# Patient Record
Sex: FEMALE | Race: OTHER | Employment: FULL TIME | URBAN - METROPOLITAN AREA
[De-identification: names, ages, dates, MRNs, and addresses within clinical notes are randomized per-mention and may not be internally consistent; named-entity substitution may affect disease eponyms.]

---

## 2017-05-15 PROBLEM — R51.9 PERSISTENT HEADACHES: Status: ACTIVE | Noted: 2017-05-15

## 2017-05-18 ENCOUNTER — HOSPITAL ENCOUNTER (OUTPATIENT)
Dept: GENERAL RADIOLOGY | Facility: HOSPITAL | Age: 42
Discharge: HOME | End: 2017-05-18
Attending: FAMILY MEDICINE
Payer: COMMERCIAL

## 2017-05-18 DIAGNOSIS — M25.511 RIGHT SHOULDER PAIN: ICD-10-CM

## 2017-05-18 DIAGNOSIS — G89.29 CHRONIC HEADACHE: ICD-10-CM

## 2017-05-18 DIAGNOSIS — M53.3 COCCYDYNIA: ICD-10-CM

## 2017-05-18 DIAGNOSIS — R51.9 CHRONIC HEADACHE: ICD-10-CM

## 2017-05-18 PROCEDURE — 73030 X-RAY EXAM OF SHOULDER: CPT | Performed by: FAMILY MEDICINE

## 2017-05-18 PROCEDURE — 72050 X-RAY EXAM NECK SPINE 4/5VWS: CPT | Performed by: FAMILY MEDICINE

## 2017-05-18 PROCEDURE — 72220 X-RAY EXAM SACRUM TAILBONE: CPT | Performed by: FAMILY MEDICINE

## 2017-06-27 ENCOUNTER — OFFICE VISIT (OUTPATIENT)
Dept: OBGYN CLINIC | Facility: CLINIC | Age: 42
End: 2017-06-27

## 2017-06-27 ENCOUNTER — APPOINTMENT (OUTPATIENT)
Dept: LAB | Facility: REFERENCE LAB | Age: 42
End: 2017-06-27
Attending: OBSTETRICS & GYNECOLOGY
Payer: COMMERCIAL

## 2017-06-27 VITALS
BODY MASS INDEX: 25.21 KG/M2 | HEIGHT: 62 IN | WEIGHT: 137 LBS | SYSTOLIC BLOOD PRESSURE: 120 MMHG | DIASTOLIC BLOOD PRESSURE: 82 MMHG

## 2017-06-27 DIAGNOSIS — Z32.00 ENCOUNTER FOR CONFIRMATION OF PREGNANCY TEST RESULT WITH PHYSICAL EXAMINATION: ICD-10-CM

## 2017-06-27 DIAGNOSIS — Z32.01 PREGNANCY EXAMINATION OR TEST, POSITIVE RESULT: Primary | ICD-10-CM

## 2017-06-27 PROCEDURE — 99204 OFFICE O/P NEW MOD 45 MIN: CPT | Performed by: OBSTETRICS & GYNECOLOGY

## 2017-06-27 PROCEDURE — 84144 ASSAY OF PROGESTERONE: CPT | Performed by: OBSTETRICS & GYNECOLOGY

## 2017-06-27 PROCEDURE — 81025 URINE PREGNANCY TEST: CPT | Performed by: OBSTETRICS & GYNECOLOGY

## 2017-06-27 PROCEDURE — 84702 CHORIONIC GONADOTROPIN TEST: CPT | Performed by: OBSTETRICS & GYNECOLOGY

## 2017-06-27 NOTE — PROGRESS NOTES
GYN H&P     2017  4:00 PM    CC:Patient presents for confirmation of pregnancy    HPI: Patient is a 42year old  Patient's last menstrual period was 2017. who presents with + UCG EGA 7 W edc 2/10/2017.  Attempting pregnancy for the past 3 Systems:    Pertinent positive and negatives reviewed in HPI      /82 (BP Location: Left arm, Patient Position: Sitting)   Ht 62\"   Wt 137 lb   LMP 05/05/2017   Breastfeeding?  No   BMI 25.06 kg/m²     Exam:   GENERAL: well developed, well nourished,

## 2017-06-28 ENCOUNTER — ULTRASOUND ENCOUNTER (OUTPATIENT)
Dept: OBGYN CLINIC | Facility: CLINIC | Age: 42
End: 2017-06-28

## 2017-06-28 ENCOUNTER — TELEPHONE (OUTPATIENT)
Dept: OBGYN CLINIC | Facility: CLINIC | Age: 42
End: 2017-06-28

## 2017-06-28 ENCOUNTER — APPOINTMENT (OUTPATIENT)
Dept: LAB | Facility: REFERENCE LAB | Age: 42
End: 2017-06-28
Attending: OBSTETRICS & GYNECOLOGY
Payer: COMMERCIAL

## 2017-06-28 DIAGNOSIS — Z32.01 PREGNANCY EXAMINATION OR TEST, POSITIVE RESULT: Primary | ICD-10-CM

## 2017-06-28 DIAGNOSIS — Z32.00 ENCOUNTER FOR CONFIRMATION OF PREGNANCY TEST RESULT WITH PHYSICAL EXAMINATION: ICD-10-CM

## 2017-06-28 PROCEDURE — 76801 OB US < 14 WKS SINGLE FETUS: CPT | Performed by: OBSTETRICS & GYNECOLOGY

## 2017-06-28 PROCEDURE — 84702 CHORIONIC GONADOTROPIN TEST: CPT | Performed by: OBSTETRICS & GYNECOLOGY

## 2017-06-28 NOTE — TELEPHONE ENCOUNTER
Patient called me back. I dw her that [place] was over 14,000. She plans USN at noon today. I dw pt to remain NPO in case there is an ectopic pregnancy. She currently denies any LAP.

## 2017-06-29 ENCOUNTER — TELEPHONE (OUTPATIENT)
Dept: OBGYN CLINIC | Facility: CLINIC | Age: 42
End: 2017-06-29

## 2017-06-29 PROBLEM — O26.891 RH NEGATIVE STATUS DURING PREGNANCY IN FIRST TRIMESTER, ANTEPARTUM: Status: ACTIVE | Noted: 2017-06-29

## 2017-06-29 PROBLEM — Z67.91 RH NEGATIVE STATUS DURING PREGNANCY IN FIRST TRIMESTER, ANTEPARTUM: Status: ACTIVE | Noted: 2017-06-29

## 2017-06-30 NOTE — TELEPHONE ENCOUNTER
Called and dw pt quant minimally went up. Plan FU usn next week. DW pt likely blighted ovum and quant should increase more.

## 2017-07-02 ENCOUNTER — HOSPITAL ENCOUNTER (EMERGENCY)
Facility: HOSPITAL | Age: 42
Discharge: HOME | End: 2017-07-02
Payer: COMMERCIAL

## 2017-07-02 ENCOUNTER — APPOINTMENT (OUTPATIENT)
Dept: ULTRASOUND IMAGING | Facility: HOSPITAL | Age: 42
End: 2017-07-02
Attending: NURSE PRACTITIONER
Payer: COMMERCIAL

## 2017-07-02 VITALS
HEART RATE: 68 BPM | WEIGHT: 135 LBS | DIASTOLIC BLOOD PRESSURE: 81 MMHG | RESPIRATION RATE: 18 BRPM | TEMPERATURE: 98 F | SYSTOLIC BLOOD PRESSURE: 119 MMHG | OXYGEN SATURATION: 100 % | BODY MASS INDEX: 24.84 KG/M2 | HEIGHT: 62 IN

## 2017-07-02 DIAGNOSIS — O20.0 THREATENED MISCARRIAGE IN EARLY PREGNANCY: Primary | ICD-10-CM

## 2017-07-02 LAB
B-HCG SERPL-ACNC: 4408 MIU/ML
B-HCG UR QL: POSITIVE
BASOPHILS # BLD: 0 K/UL (ref 0–0.2)
BASOPHILS NFR BLD: 0 %
BILIRUB UR QL: NEGATIVE
EOSINOPHIL # BLD: 0.1 K/UL (ref 0–0.7)
EOSINOPHIL NFR BLD: 1 %
ERYTHROCYTE [DISTWIDTH] IN BLOOD BY AUTOMATED COUNT: 14.3 % (ref 11–15)
GLUCOSE UR-MCNC: NEGATIVE MG/DL
HCT VFR BLD AUTO: 41.6 % (ref 35–48)
HGB BLD-MCNC: 14 G/DL (ref 12–16)
KETONES UR-MCNC: NEGATIVE MG/DL
LYMPHOCYTES # BLD: 1.3 K/UL (ref 1–4)
LYMPHOCYTES NFR BLD: 13 %
MCH RBC QN AUTO: 29.8 PG (ref 27–32)
MCHC RBC AUTO-ENTMCNC: 33.5 G/DL (ref 32–37)
MCV RBC AUTO: 88.8 FL (ref 80–100)
MONOCYTES # BLD: 0.5 K/UL (ref 0–1)
MONOCYTES NFR BLD: 5 %
NEUTROPHILS # BLD AUTO: 8.5 K/UL (ref 1.8–7.7)
NEUTROPHILS NFR BLD: 81 %
NITRITE UR QL STRIP.AUTO: NEGATIVE
PH UR: 7 [PH] (ref 5–8)
PLATELET # BLD AUTO: 385 K/UL (ref 140–400)
PMV BLD AUTO: 7.9 FL (ref 7.4–10.3)
PROT UR-MCNC: >=500 MG/DL
RBC # BLD AUTO: 4.69 M/UL (ref 3.7–5.4)
RBC #/AREA URNS AUTO: ABNORMAL /HPF
RH BLOOD TYPE: POSITIVE
SP GR UR STRIP: 1.03 (ref 1–1.03)
UROBILINOGEN UR STRIP-ACNC: <2
VIT C UR-MCNC: 40 MG/DL
WBC # BLD AUTO: 10.4 K/UL (ref 4–11)
WBC #/AREA URNS AUTO: 264 /HPF

## 2017-07-02 PROCEDURE — 84702 CHORIONIC GONADOTROPIN TEST: CPT | Performed by: NURSE PRACTITIONER

## 2017-07-02 PROCEDURE — 81025 URINE PREGNANCY TEST: CPT

## 2017-07-02 PROCEDURE — 86901 BLOOD TYPING SEROLOGIC RH(D): CPT | Performed by: NURSE PRACTITIONER

## 2017-07-02 PROCEDURE — 99285 EMERGENCY DEPT VISIT HI MDM: CPT

## 2017-07-02 PROCEDURE — 85025 COMPLETE CBC W/AUTO DIFF WBC: CPT | Performed by: NURSE PRACTITIONER

## 2017-07-02 PROCEDURE — 36415 COLL VENOUS BLD VENIPUNCTURE: CPT

## 2017-07-02 PROCEDURE — 76817 TRANSVAGINAL US OBSTETRIC: CPT | Performed by: NURSE PRACTITIONER

## 2017-07-02 PROCEDURE — 81001 URINALYSIS AUTO W/SCOPE: CPT | Performed by: NURSE PRACTITIONER

## 2017-07-02 PROCEDURE — 76801 OB US < 14 WKS SINGLE FETUS: CPT | Performed by: NURSE PRACTITIONER

## 2017-07-02 PROCEDURE — 87086 URINE CULTURE/COLONY COUNT: CPT | Performed by: NURSE PRACTITIONER

## 2017-07-02 PROCEDURE — 86900 BLOOD TYPING SEROLOGIC ABO: CPT | Performed by: NURSE PRACTITIONER

## 2017-07-02 RX ORDER — NITROFURANTOIN 25; 75 MG/1; MG/1
100 CAPSULE ORAL 2 TIMES DAILY
Qty: 20 CAPSULE | Refills: 0 | Status: SHIPPED | OUTPATIENT
Start: 2017-07-02 | End: 2017-07-09

## 2017-07-02 RX ORDER — NAPROXEN 250 MG/1
250 TABLET ORAL
COMMUNITY
End: 2017-09-14 | Stop reason: ALTCHOICE

## 2017-07-02 NOTE — ED PROVIDER NOTES
Patient Seen in: [facility] AND [facility] Emergency Department    History   Patient presents with:  Pregnancy Issues (gynecologic)    Stated Complaint: abdominal pain started 1 hour ago with vaginal bleeding positive pregnancy from*    Patient presents into the Alcohol use: No               Comment: occ      Review of Systems   Gastrointestinal: Positive for abdominal pain. Genitourinary: Positive for vaginal bleeding. All other systems reviewed and are negative.       Positive for stated c is alert and oriented to person, place, and time. Skin: Skin is warm and dry. She is not diaphoretic.            ED Course     Labs Reviewed   URINALYSIS WITH CULTURE REFLEX - Abnormal; Notable for the following:        Result Value    Urine Color Red (*) the hospital encounter of 07/02/17  -HCG, BETA SUBUNIT, QUANT   Collection Time: 07/02/17 11:48 AM   Result Value Ref Range   Hcg Quantitative 4,408.0 miu/mL   -RAINBOW DRAW BLUE   Collection Time: 07/02/17 11:48 AM   Result Value Ref Range   Hold Blue Aut Ketones Urine Negative Negative mg/dL   Bilirubin Urine Negative Negative   Blood Urine Moderate (A) Negative   Nitrite Urine Negative Negative   Urobilinogen Urine <2.0 <2.0   Leukocyte Esterase Urine Trace (A) Negative   Ascorbic Acid Urine 40  (A) Neg

## 2017-07-05 ENCOUNTER — OFFICE VISIT (OUTPATIENT)
Dept: OBGYN CLINIC | Facility: CLINIC | Age: 42
End: 2017-07-05

## 2017-07-05 ENCOUNTER — ULTRASOUND ENCOUNTER (OUTPATIENT)
Dept: OBGYN CLINIC | Facility: CLINIC | Age: 42
End: 2017-07-05

## 2017-07-05 DIAGNOSIS — O20.0 THREATENED ABORTION, ANTEPARTUM: Primary | ICD-10-CM

## 2017-07-05 DIAGNOSIS — O20.0 THREATENED ABORTION, ANTEPARTUM: ICD-10-CM

## 2017-07-05 PROBLEM — Z67.90 BLOOD TYPE, RH POSITIVE: Status: ACTIVE | Noted: 2017-06-29

## 2017-07-05 PROCEDURE — 76816 OB US FOLLOW-UP PER FETUS: CPT | Performed by: OBSTETRICS & GYNECOLOGY

## 2017-07-05 PROCEDURE — 99212 OFFICE O/P EST SF 10 MIN: CPT | Performed by: OBSTETRICS & GYNECOLOGY

## 2017-08-02 ENCOUNTER — OFFICE VISIT (OUTPATIENT)
Dept: OBGYN CLINIC | Facility: CLINIC | Age: 42
End: 2017-08-02

## 2017-08-02 VITALS
WEIGHT: 135 LBS | HEIGHT: 62 IN | HEART RATE: 89 BPM | SYSTOLIC BLOOD PRESSURE: 128 MMHG | DIASTOLIC BLOOD PRESSURE: 84 MMHG | BODY MASS INDEX: 24.84 KG/M2

## 2017-08-02 DIAGNOSIS — D25.2 INTRAMURAL AND SUBSEROUS LEIOMYOMA OF UTERUS: ICD-10-CM

## 2017-08-02 DIAGNOSIS — D25.1 INTRAMURAL AND SUBSEROUS LEIOMYOMA OF UTERUS: ICD-10-CM

## 2017-08-02 DIAGNOSIS — R92.8 ABNORMAL MAMMOGRAM: ICD-10-CM

## 2017-08-02 DIAGNOSIS — Z01.411 ENCOUNTER FOR GYNECOLOGICAL EXAMINATION WITH ABNORMAL FINDING: Primary | ICD-10-CM

## 2017-08-02 PROCEDURE — 99214 OFFICE O/P EST MOD 30 MIN: CPT | Performed by: OBSTETRICS & GYNECOLOGY

## 2017-08-02 PROCEDURE — 99386 PREV VISIT NEW AGE 40-64: CPT | Performed by: OBSTETRICS & GYNECOLOGY

## 2017-08-02 NOTE — PROGRESS NOTES
Bette Hamilton is a 42year old female  Patient's last menstrual period was 2017.  Patient presents with:  Gyn Exam: annual exam & mammo order/NEW PT TO [place]  -- recent SAB after years of trying -- thought could not get pregn due to left block Problem Relation Age of Onset   • Diabetes Mother    • High Blood Pressure Father    • Stroke Paternal Grandfather    • Thyroid disease Paternal Aunt    • Cancer Neg        MEDICATIONS:    Current Outpatient Prescriptions:   •  naproxen 250 MG Oral Tab, 16 wk irregular uterus  Adnexa: nonpalpable  Perineum: normal  Anus: no hemorroids     PROCEDURE:            US PREGNANCY <14 WEEKS (TRANSABDOM/TRANSVAG) (CPT=76801/73975)     COMPARISON:           None.      INDICATIONS:            Vaginal bleeding     JUSTO Plan:  Nikki Meade was seen today for gyn exam, other and fertility.     Diagnoses and all orders for this visit:    Encounter for gynecological examination with abnormal finding    Abnormal mammogram  -     [facility] JOJO 2D+3D DIAGNOSTIC [facility]  BILAT (CPT=77066/7706

## 2017-09-07 ENCOUNTER — HOSPITAL ENCOUNTER (OUTPATIENT)
Dept: MAMMOGRAPHY | Facility: HOSPITAL | Age: 42
Discharge: HOME | End: 2017-09-07
Attending: OBSTETRICS & GYNECOLOGY
Payer: COMMERCIAL

## 2017-09-07 ENCOUNTER — HOSPITAL ENCOUNTER (OUTPATIENT)
Dept: ULTRASOUND IMAGING | Facility: HOSPITAL | Age: 42
Discharge: HOME | End: 2017-09-07
Attending: OBSTETRICS & GYNECOLOGY
Payer: COMMERCIAL

## 2017-09-07 DIAGNOSIS — R92.8 ABNORMAL MAMMOGRAM: ICD-10-CM

## 2017-09-07 PROCEDURE — 77066 DX MAMMO INCL CAD BI: CPT | Performed by: OBSTETRICS & GYNECOLOGY

## 2017-09-07 PROCEDURE — 76642 ULTRASOUND BREAST LIMITED: CPT | Performed by: OBSTETRICS & GYNECOLOGY

## 2017-09-07 PROCEDURE — 77062 BREAST TOMOSYNTHESIS BI: CPT | Performed by: OBSTETRICS & GYNECOLOGY

## 2017-09-14 PROCEDURE — 81001 URINALYSIS AUTO W/SCOPE: CPT | Performed by: OBSTETRICS & GYNECOLOGY

## 2017-09-14 PROCEDURE — 88175 CYTOPATH C/V AUTO FLUID REDO: CPT | Performed by: OBSTETRICS & GYNECOLOGY

## 2017-09-14 PROCEDURE — 87624 HPV HI-RISK TYP POOLED RSLT: CPT | Performed by: OBSTETRICS & GYNECOLOGY

## 2017-09-14 PROCEDURE — 87491 CHLMYD TRACH DNA AMP PROBE: CPT | Performed by: OBSTETRICS & GYNECOLOGY

## 2017-09-14 PROCEDURE — 87591 N.GONORRHOEAE DNA AMP PROB: CPT | Performed by: OBSTETRICS & GYNECOLOGY

## 2017-09-14 PROCEDURE — 87086 URINE CULTURE/COLONY COUNT: CPT | Performed by: OBSTETRICS & GYNECOLOGY

## 2017-09-14 PROCEDURE — 83520 IMMUNOASSAY QUANT NOS NONAB: CPT | Performed by: OBSTETRICS & GYNECOLOGY

## 2017-09-27 ENCOUNTER — LAB ENCOUNTER (OUTPATIENT)
Dept: LAB | Age: 42
End: 2017-09-27
Attending: OBSTETRICS & GYNECOLOGY
Payer: COMMERCIAL

## 2017-09-27 DIAGNOSIS — N97.9 FEMALE INFERTILITY: ICD-10-CM

## 2017-09-27 DIAGNOSIS — Z01.411 ABNORMAL FEMALE PELVIC EXAM: Primary | ICD-10-CM

## 2017-09-27 DIAGNOSIS — N91.2 AMENORRHEA: ICD-10-CM

## 2017-09-27 LAB
B-HCG SERPL-ACNC: <0.6 MIU/ML
BACTERIA UR QL AUTO: NEGATIVE /HPF
BILIRUB UR QL: NEGATIVE
COLOR UR: YELLOW
ESTRADIOL SERPL-MCNC: 27 PG/ML
FSH SERPL-ACNC: 9.1 MIU/ML
GLUCOSE UR-MCNC: NEGATIVE MG/DL
KETONES UR-MCNC: NEGATIVE MG/DL
LEUKOCYTE ESTERASE UR QL STRIP.AUTO: NEGATIVE
LH SERPL-ACNC: 3.1 MIU/ML
NITRITE UR QL STRIP.AUTO: NEGATIVE
PH UR: 7 [PH] (ref 5–8)
PROGEST SERPL-MCNC: 0.3 NG/ML
PROT UR-MCNC: NEGATIVE MG/DL
RBC #/AREA URNS AUTO: 231 /HPF
SP GR UR STRIP: 1.02 (ref 1–1.03)
UROBILINOGEN UR STRIP-ACNC: <2
VIT C UR-MCNC: 20 MG/DL
WBC #/AREA URNS AUTO: <1 /HPF

## 2017-09-27 PROCEDURE — 36415 COLL VENOUS BLD VENIPUNCTURE: CPT

## 2017-09-27 PROCEDURE — 83002 ASSAY OF GONADOTROPIN (LH): CPT

## 2017-09-27 PROCEDURE — 84144 ASSAY OF PROGESTERONE: CPT

## 2017-09-27 PROCEDURE — 84702 CHORIONIC GONADOTROPIN TEST: CPT

## 2017-09-27 PROCEDURE — 83001 ASSAY OF GONADOTROPIN (FSH): CPT

## 2017-09-27 PROCEDURE — 82670 ASSAY OF TOTAL ESTRADIOL: CPT

## 2017-09-27 PROCEDURE — 81001 URINALYSIS AUTO W/SCOPE: CPT

## 2017-09-27 NOTE — PROGRESS NOTES
[phone] (home)   Telephone Information:  Mobile          [phone] and left a message requesting c/b to review the below

## 2017-09-28 NOTE — PROGRESS NOTES
Telephone Information:  Mobile          [phone]    Lmtcb redarding below  MD recommendations .  I did speak to her regarding the blood test . We need to see if she want a ref to infertility

## 2017-09-28 NOTE — PROGRESS NOTES
Normal Day 3 labs, but FSH on higher end of normal (brain working a little harder to get ovary response)

## 2017-09-28 NOTE — PROGRESS NOTES
Normal day 3 labs, but low AMH level indicating that she can have a hard time trying to get pregnant (in addition to the history of her blocked tube).   Can we offer an infertility referral? I know that Dr. Ros Johnson talked to her about this at her last visit

## 2017-09-28 NOTE — PROGRESS NOTES
Telephone Information:  Mobile          [phone]  Lmtcb redarding below test result and MD recommendations

## 2017-10-03 NOTE — PROGRESS NOTES
Called and spoke with pt regarding the below. Pt states she would to check one more urine. Pt states in July she had urine taken during miscarriage, and in september 1 was during her period.  Reordered Ua, also gave pt urology information

## 2017-10-09 ENCOUNTER — LAB ENCOUNTER (OUTPATIENT)
Dept: LAB | Facility: HOSPITAL | Age: 42
End: 2017-10-09
Attending: OBSTETRICS & GYNECOLOGY
Payer: COMMERCIAL

## 2017-10-09 DIAGNOSIS — R31.9 HEMATURIA, UNSPECIFIED TYPE: ICD-10-CM

## 2017-10-09 PROCEDURE — 81003 URINALYSIS AUTO W/O SCOPE: CPT

## 2017-10-11 NOTE — PROGRESS NOTES
[phone] (home)   Telephone Information:  Mobile          [phone]    Pt notified with verbal understanding

## 2017-11-25 ENCOUNTER — TELEPHONE (OUTPATIENT)
Dept: OBGYN CLINIC | Facility: CLINIC | Age: 42
End: 2017-11-25

## 2017-12-29 ENCOUNTER — LAB ENCOUNTER (OUTPATIENT)
Dept: LAB | Facility: HOSPITAL | Age: 42
End: 2017-12-29
Attending: NURSE PRACTITIONER
Payer: COMMERCIAL

## 2017-12-29 DIAGNOSIS — Z13.220 SCREENING FOR LIPID DISORDERS: ICD-10-CM

## 2017-12-29 DIAGNOSIS — R53.81 MALAISE AND FATIGUE: ICD-10-CM

## 2017-12-29 DIAGNOSIS — M25.50 ARTHRALGIA, UNSPECIFIED JOINT: ICD-10-CM

## 2017-12-29 DIAGNOSIS — R53.83 MALAISE AND FATIGUE: ICD-10-CM

## 2017-12-29 DIAGNOSIS — Z13.21 ENCOUNTER FOR VITAMIN DEFICIENCY SCREENING: ICD-10-CM

## 2017-12-29 DIAGNOSIS — I73.00 RAYNAUD'S PHENOMENON WITHOUT GANGRENE: ICD-10-CM

## 2017-12-29 PROCEDURE — 84443 ASSAY THYROID STIM HORMONE: CPT

## 2017-12-29 PROCEDURE — 85652 RBC SED RATE AUTOMATED: CPT

## 2017-12-29 PROCEDURE — 86038 ANTINUCLEAR ANTIBODIES: CPT

## 2017-12-29 PROCEDURE — 36415 COLL VENOUS BLD VENIPUNCTURE: CPT

## 2017-12-29 PROCEDURE — 82306 VITAMIN D 25 HYDROXY: CPT

## 2017-12-29 PROCEDURE — 86431 RHEUMATOID FACTOR QUANT: CPT

## 2017-12-29 PROCEDURE — 80053 COMPREHEN METABOLIC PANEL: CPT

## 2017-12-29 PROCEDURE — 86140 C-REACTIVE PROTEIN: CPT

## 2017-12-29 PROCEDURE — 85025 COMPLETE CBC W/AUTO DIFF WBC: CPT

## 2017-12-29 PROCEDURE — 80061 LIPID PANEL: CPT

## 2018-01-02 LAB — NUCLEAR IGG TITR SER IF: NEGATIVE {TITER}

## 2018-02-16 ENCOUNTER — HOSPITAL ENCOUNTER (OUTPATIENT)
Dept: MRI IMAGING | Facility: HOSPITAL | Age: 43
Discharge: HOME | End: 2018-02-16
Attending: SPECIALIST
Payer: COMMERCIAL

## 2018-02-16 DIAGNOSIS — D25.9 UTERINE LEIOMYOMA, UNSPECIFIED LOCATION: ICD-10-CM

## 2018-02-16 PROCEDURE — A9575 INJ GADOTERATE MEGLUMI 0.1ML: HCPCS | Performed by: SPECIALIST

## 2018-02-16 PROCEDURE — 72197 MRI PELVIS W/O & W/DYE: CPT | Performed by: SPECIALIST

## 2018-03-09 ENCOUNTER — LAB REQUISITION (OUTPATIENT)
Dept: LAB | Facility: HOSPITAL | Age: 43
End: 2018-03-09
Payer: COMMERCIAL

## 2018-03-09 DIAGNOSIS — N84.0 POLYP OF CORPUS UTERI: ICD-10-CM

## 2018-03-09 PROCEDURE — 88305 TISSUE EXAM BY PATHOLOGIST: CPT | Performed by: SPECIALIST

## 2018-05-15 PROCEDURE — 81001 URINALYSIS AUTO W/SCOPE: CPT | Performed by: INTERNAL MEDICINE

## 2018-05-15 PROCEDURE — 87086 URINE CULTURE/COLONY COUNT: CPT | Performed by: INTERNAL MEDICINE

## 2018-05-15 PROCEDURE — 87088 URINE BACTERIA CULTURE: CPT | Performed by: INTERNAL MEDICINE

## 2018-05-15 PROCEDURE — 87186 SC STD MICRODIL/AGAR DIL: CPT | Performed by: INTERNAL MEDICINE

## 2018-05-19 ENCOUNTER — HOSPITAL ENCOUNTER (OUTPATIENT)
Dept: MRI IMAGING | Facility: HOSPITAL | Age: 43
Discharge: HOME | End: 2018-05-19
Attending: SPECIALIST
Payer: COMMERCIAL

## 2018-05-19 DIAGNOSIS — D25.9 UTERINE LEIOMYOMA, UNSPECIFIED LOCATION: ICD-10-CM

## 2018-05-19 PROCEDURE — 72197 MRI PELVIS W/O & W/DYE: CPT | Performed by: SPECIALIST

## 2018-05-19 PROCEDURE — A9576 INJ PROHANCE MULTIPACK: HCPCS | Performed by: SPECIALIST

## 2018-05-25 ENCOUNTER — TELEPHONE (OUTPATIENT)
Dept: OBGYN CLINIC | Facility: CLINIC | Age: 43
End: 2018-05-25

## 2018-05-25 NOTE — TELEPHONE ENCOUNTER
Appointment made with Dr. Alix Neal for 05/29 at 1:20 pm with Dr. Alix Neal. MRI results placed on Dr. Falcon Friday desk.

## 2018-05-25 NOTE — TELEPHONE ENCOUNTER
Received results of pelvic MRI by fax. Pt is to schedule appointment with Dr. Aniyah Hernandez for consultation. Lmtcb.

## 2018-05-29 ENCOUNTER — OFFICE VISIT (OUTPATIENT)
Dept: OBGYN CLINIC | Facility: CLINIC | Age: 43
End: 2018-05-29

## 2018-05-29 VITALS
DIASTOLIC BLOOD PRESSURE: 87 MMHG | BODY MASS INDEX: 24 KG/M2 | HEART RATE: 75 BPM | SYSTOLIC BLOOD PRESSURE: 130 MMHG | WEIGHT: 134 LBS

## 2018-05-29 DIAGNOSIS — D25.1 INTRAMURAL, SUBMUCOUS, AND SUBSEROUS LEIOMYOMA OF UTERUS: Primary | ICD-10-CM

## 2018-05-29 DIAGNOSIS — D25.2 INTRAMURAL, SUBMUCOUS, AND SUBSEROUS LEIOMYOMA OF UTERUS: Primary | ICD-10-CM

## 2018-05-29 DIAGNOSIS — D25.0 INTRAMURAL, SUBMUCOUS, AND SUBSEROUS LEIOMYOMA OF UTERUS: Primary | ICD-10-CM

## 2018-05-29 PROBLEM — Z32.01 PREGNANCY EXAMINATION OR TEST, POSITIVE RESULT: Status: RESOLVED | Noted: 2017-06-27 | Resolved: 2018-05-29

## 2018-05-29 PROBLEM — Z67.90 BLOOD TYPE, RH POSITIVE: Status: RESOLVED | Noted: 2017-06-29 | Resolved: 2018-05-29

## 2018-05-29 PROCEDURE — 99214 OFFICE O/P EST MOD 30 MIN: CPT | Performed by: OBSTETRICS & GYNECOLOGY

## 2018-05-29 RX ORDER — ENOXAPARIN SODIUM 100 MG/ML
INJECTION SUBCUTANEOUS
COMMUNITY
Start: 2018-04-19 | End: 2018-06-07 | Stop reason: ALTCHOICE

## 2018-05-29 RX ORDER — CLINDAMYCIN PHOSPHATE AND BENZOYL PEROXIDE 10; 50 MG/G; MG/G
GEL TOPICAL
COMMUNITY
Start: 2012-09-06 | End: 2018-06-07

## 2018-05-29 RX ORDER — CHORIONIC GONADOTROPIN 10000 UNIT
KIT INTRAMUSCULAR
COMMUNITY
Start: 2018-04-19 | End: 2018-05-29

## 2018-05-29 RX ORDER — PROGESTERONE 100 MG/1
INSERT VAGINAL
COMMUNITY
Start: 2018-03-08 | End: 2018-05-29

## 2018-05-29 RX ORDER — ESTRADIOL 2 MG/1
TABLET ORAL
COMMUNITY
Start: 2018-04-19 | End: 2018-05-29

## 2018-05-29 RX ORDER — ASCORBIC ACID, CHOLECALCIFEROL, .ALPHA.-TOCOPHEROL ACETATE, DL-, PYRIDOXINE HYDROCHLORIDE, FOLIC ACID, CYANOCOBALAMIN, BIOTIN, CALCIUM CARBONATE, FERROUS ASPARTO GLYCINATE, IRON, POTASSIUM IODIDE, MAGNESIUM OXIDE, DOCONEXENT AND LOWBUSH BLUEBERRY 60; 1000; 10; 26; 400; 13; 280; 80; 9; 9; 150; 25; 350; 25; 600 MG/1; [IU]/1; [IU]/1; MG/1; UG/1; UG/1; UG/1; MG/1; MG/1; MG/1; UG/1; MG/1; MG/1; MG/1; UG/1
CAPSULE, GELATIN COATED ORAL
COMMUNITY
Start: 2018-05-09 | End: 2020-09-29 | Stop reason: ALTCHOICE

## 2018-05-29 RX ORDER — MENOTROPINS 75 UNIT
KIT SUBCUTANEOUS
COMMUNITY
Start: 2018-04-19 | End: 2018-05-29

## 2018-05-29 RX ORDER — TRETINOIN 0.05 G/100G
GEL TOPICAL
COMMUNITY
Start: 2012-09-06 | End: 2018-05-29

## 2018-05-29 RX ORDER — TESTOSTERONE 2 MG/D
PATCH TRANSDERMAL
Refills: 0 | COMMUNITY
Start: 2018-04-17 | End: 2018-05-29

## 2018-05-29 RX ORDER — DOXYCYCLINE HYCLATE 100 MG
TABLET ORAL
COMMUNITY
Start: 2018-04-19 | End: 2018-05-29

## 2018-05-29 RX ORDER — FOLLITROPIN 975 [IU]/1.17ML
INJECTION, SOLUTION SUBCUTANEOUS
COMMUNITY
Start: 2018-04-19 | End: 2018-05-29

## 2018-05-29 NOTE — PROGRESS NOTES
Lamberto Dow is a 36year old female  Patient's last menstrual period was 2018.  Patient presents with:  Consult: Discuss fibroid removal -- since last seen in 2017 -- feels like fibroids have grown \"like a baby's head\" in lower ab Wnseyj-QaKmj-FiBuq-Meth-FA-DHA (PRENATE MINI) 18-0.6-0.4-350 MG Oral Cap, , Disp: , Rfl:   •  Clindamycin Phos-Benzoyl Perox (DUAC) 1.2-5 % External Gel, None Entered, Disp: , Rfl:   •  aspirin 81 MG Oral Tab, Take 81 mg by mouth daily. , Disp: , Rfl:   • motion  Uterus:   18 wk irregular  Adnexa:   normal without masses or tenderness  Perineum:   normal  Anus:    no hemorroids   Lymph node:   no inguinal lymph nodes    PROCEDURE:  MRI PELVIS (SOFT TISSUE) (W+WO) (CPT=72197)     COMPARISON: [employer] present in the endocervical region. OVARIES:         Normal bilateral appearance with no significant masses. Each ovary is normal in size for a patient of this age. CUL-DE-SAC:   No fluid or mass.     LYMPH NODES:            Negative for lymphadenopathy freezing her embryos & then having abdominal myomectomy done.  Reviewed risks of myomectomy in detail including but not limited to infection , blood loss w/ blood transfusion risk, anesth risks, tubal blockage postop (per pt already one blocked), need for f

## 2018-05-31 ENCOUNTER — OFFICE VISIT (OUTPATIENT)
Dept: OBGYN CLINIC | Facility: CLINIC | Age: 43
End: 2018-05-31

## 2018-05-31 VITALS — BODY MASS INDEX: 24 KG/M2 | SYSTOLIC BLOOD PRESSURE: 120 MMHG | DIASTOLIC BLOOD PRESSURE: 66 MMHG | WEIGHT: 134 LBS

## 2018-05-31 DIAGNOSIS — D25.1 INTRAMURAL AND SUBSEROUS LEIOMYOMA OF UTERUS: Primary | ICD-10-CM

## 2018-05-31 DIAGNOSIS — D25.2 INTRAMURAL AND SUBSEROUS LEIOMYOMA OF UTERUS: Primary | ICD-10-CM

## 2018-05-31 PROCEDURE — 99214 OFFICE O/P EST MOD 30 MIN: CPT | Performed by: OBSTETRICS & GYNECOLOGY

## 2018-05-31 NOTE — PROGRESS NOTES
HPI:    Patient ID: March Venkata is a 36year old female. HPI  New patient  Comes for second opinion  24-year-old  1 para 0010 who presents for second opinion regarding very large uterine fibroids.   Patient was advised by her reproductive end counseled extensively and all questions answered. Review of Systems   Patient denies pelvic pain or bladder pressure. She denies difficulty with bowel movements or constipation. Note patient saw another gynecologist 2 days ago self-referred.   Allergies: occupying the midline of the pelvis and displacing the uterus to the right. The mass compresses the bladder superiorly and extends posteriorly. The overall uterine size is about 18 weeks. Some mobility of the uterus noted.   Adnexa-  Nontender, no masses

## 2018-06-01 ENCOUNTER — TELEPHONE (OUTPATIENT)
Dept: OBGYN CLINIC | Facility: CLINIC | Age: 43
End: 2018-06-01

## 2018-06-01 DIAGNOSIS — D25.9 UTERINE LEIOMYOMA, UNSPECIFIED LOCATION: Primary | ICD-10-CM

## 2018-06-01 NOTE — TELEPHONE ENCOUNTER
Two different phone messages -- one for me to do myomectomy & separate one for Dr. Jeanette Cortés to do myomectomy. -- patient needs to clarify who she wants for surgeon. We are not partners -- we are two completely separate groups.  She needs to chose one & stay wi

## 2018-06-01 NOTE — TELEPHONE ENCOUNTER
----- Message from Memo Lindsay RN sent at 6/1/2018  9:58 AM CDT -----  Regarding: FW: Visit Follow-up Question  Contact: [phone]      ----- Message -----  From: Gail Solis  Sent: 5/31/2018  10:12 PM  To: Rebekah Ng Ob/Gyne Clinical Staff  Subject: Visit Follow-up Question                         Hello,  I want to schedule an appointment for the myomectomy.  My # [phone]    Thanks,  Dorita Savage

## 2018-06-01 NOTE — TELEPHONE ENCOUNTER
Please schedule abdominal myomectomy and preop visit. Dx: Enlarging uterine fibroids. Please inform Dr. Na Orourke office of patient's desire to proceed with surgical scheduling at this time.   Gyne surgical assistant

## 2018-06-04 NOTE — TELEPHONE ENCOUNTER
Pt states that she sent msg to both clinic to see which clinic would call her back the fastest. She'd like to set up with [facility]

## 2018-06-04 NOTE — TELEPHONE ENCOUNTER
Noted -- since pt asymptomatic, had strongly urged pt to get embryos frozen first in order to maximize ability to get pregnant with own eggs given age over 36. Pt aware of my recs.

## 2018-06-04 NOTE — TELEPHONE ENCOUNTER
Noted.  Should have preop visit approx a week prior. Should contact Dr. Art Lima office to inform of patient's request to proceed with the surgery.

## 2018-06-04 NOTE — TELEPHONE ENCOUNTER
Pt is scheduled 6/13 @ 9 am.  Called pt and informed her of sx details (date, time, location). She Understood and verbalized agreement. Dr. David Buenrostro office has been informed of her sx date.   Pre-op scheduled next Friday 6/8.    -assist pending

## 2018-06-04 NOTE — TELEPHONE ENCOUNTER
Lmtcb. PLEASE RELAY AND CONFIRM:      Patient is scheduled for procedure with , which is not a part of [facility] ob/gyn group. Called to confirm who patient wants to have the procedure with, due to scheduled procedure with a md in another group.

## 2018-06-05 NOTE — PROGRESS NOTES
Health Maintenance Summary     Topic Due On Due Status Completed On    Immunization - Pneumococcal  Completed Aug 17, 2016    Medicare Wellness Visit Aug 23, 2018 Due Soon Aug 23, 2017    IMMUNIZATION - DTaP/Tdap/Td Jun 29, 1946 Overdue     Immunization-Influenza  Completed Oct 11, 2017    Depression Screening Aug 23, 2018 Not Due Aug 23, 2017          Patient is due for topics as listed above, she wishes to discuss with provider.             Was seen in ER for LA cramping and vaginal bleeding. She passed tissue. Bleeding is not heavy now. USN today c/w no retained POC. DW pt that she likely had a blighted ovum with spontaneous .  We discussed that she had huge fibroids, but I wante

## 2018-06-07 ENCOUNTER — LAB ENCOUNTER (OUTPATIENT)
Dept: LAB | Facility: HOSPITAL | Age: 43
End: 2018-06-07
Attending: OBSTETRICS & GYNECOLOGY
Payer: COMMERCIAL

## 2018-06-07 DIAGNOSIS — Z01.818 PREOP TESTING: ICD-10-CM

## 2018-06-07 PROCEDURE — 81479 UNLISTED MOLECULAR PATHOLOGY: CPT

## 2018-06-07 PROCEDURE — 81403 MOPATH PROCEDURE LEVEL 4: CPT

## 2018-06-07 PROCEDURE — 86900 BLOOD TYPING SEROLOGIC ABO: CPT

## 2018-06-07 PROCEDURE — 86901 BLOOD TYPING SEROLOGIC RH(D): CPT

## 2018-06-07 PROCEDURE — 36415 COLL VENOUS BLD VENIPUNCTURE: CPT

## 2018-06-07 PROCEDURE — 86850 RBC ANTIBODY SCREEN: CPT

## 2018-06-07 PROCEDURE — 85025 COMPLETE CBC W/AUTO DIFF WBC: CPT

## 2018-06-07 RX ORDER — FAMOTIDINE 20 MG/1
20 TABLET ORAL ONCE
Status: CANCELLED | OUTPATIENT
Start: 2018-06-07 | End: 2018-06-07

## 2018-06-07 RX ORDER — METOCLOPRAMIDE 10 MG/1
10 TABLET ORAL ONCE
Status: CANCELLED | OUTPATIENT
Start: 2018-06-07 | End: 2018-06-07

## 2018-06-08 ENCOUNTER — OFFICE VISIT (OUTPATIENT)
Dept: OBGYN CLINIC | Facility: CLINIC | Age: 43
End: 2018-06-08

## 2018-06-08 VITALS
HEART RATE: 82 BPM | SYSTOLIC BLOOD PRESSURE: 132 MMHG | BODY MASS INDEX: 25 KG/M2 | DIASTOLIC BLOOD PRESSURE: 87 MMHG | WEIGHT: 135 LBS

## 2018-06-08 DIAGNOSIS — D25.1 INTRAMURAL AND SUBSEROUS LEIOMYOMA OF UTERUS: Primary | ICD-10-CM

## 2018-06-08 DIAGNOSIS — D25.2 INTRAMURAL AND SUBSEROUS LEIOMYOMA OF UTERUS: Primary | ICD-10-CM

## 2018-06-08 PROCEDURE — 99213 OFFICE O/P EST LOW 20 MIN: CPT | Performed by: OBSTETRICS & GYNECOLOGY

## 2018-06-08 NOTE — H&P
[address] Patient Status:  Surgery Admit    1975 MRN [id number]   Location [place] [address] Attending Ger Lopez MD   Hosp Day # 0 PCP Serafin Jerez MD     Date of will discuss with the reproductive endocrinologist most recommended appropriate timing of egg retrieval and freezing.     We discussed also issues relating to post recovery. Used Miralax supplement.   Risks and benefits of surgery were discussed in detail ending 06/08/18 1506    Constitutional: She appears well-developed and well-nourished. HENT:   Head: Normocephalic. Neck: Normal range of motion. Cardiovascular: Normal rate and regular rhythm. Pulmonary/Chest: Effort normal.   Abdominal: Soft.  No 12/29/2017   CO2 26 12/29/2017   GLU 98 12/29/2017   CA 9.2 12/29/2017   ALB 4.2 12/29/2017   ALKPHO 24 (L) 12/29/2017   BILT 0.5 12/29/2017   TP 7.5 12/29/2017   AST 21 12/29/2017   ALT 31 12/29/2017   TSH 1.61 12/29/2017   ESRML 8 12/29/2017   CRP <0.5 1

## 2018-06-08 NOTE — PROGRESS NOTES
HPI:    Patient ID: Balaji Miguel is a 36year old female. HPI  f/u fibroids  Noting lower abdominal and bladder pressure more often. Denies uri sx. Denies dysuria, or urgency. Some frequency. Discussed Miralax preop.   Scheduled for myomectomie use: Yes           0.0 oz/week     Comment: OCC       PHYSICAL EXAM:    Physical Exam  Vitals: /87   Pulse 82   Wt 135 lb (61.2 kg)   LMP 05/13/2018   Breastfeeding?  No   BMI 24.69 kg/m²   Lungs clear to auscultation  Heart:  RRR; normal S1 and S2.

## 2018-06-12 ENCOUNTER — TELEPHONE (OUTPATIENT)
Dept: ADMINISTRATIVE | Age: 43
End: 2018-06-12

## 2018-06-12 NOTE — TELEPHONE ENCOUNTER
[employer] Disability form received in LU[employer]. Logged for processing. [employer] packet emailed to pt ([email or web address]).  JOSE

## 2018-06-13 ENCOUNTER — ANESTHESIA (OUTPATIENT)
Dept: SURGERY | Facility: HOSPITAL | Age: 43
DRG: 743 | End: 2018-06-13
Payer: COMMERCIAL

## 2018-06-13 ENCOUNTER — HOSPITAL ENCOUNTER (INPATIENT)
Facility: HOSPITAL | Age: 43
LOS: 1 days | Discharge: HOME | DRG: 743 | End: 2018-06-14
Attending: OBSTETRICS & GYNECOLOGY | Admitting: OBSTETRICS & GYNECOLOGY
Payer: COMMERCIAL

## 2018-06-13 ENCOUNTER — ANESTHESIA EVENT (OUTPATIENT)
Dept: SURGERY | Facility: HOSPITAL | Age: 43
DRG: 743 | End: 2018-06-13
Payer: COMMERCIAL

## 2018-06-13 ENCOUNTER — SURGERY (OUTPATIENT)
Age: 43
End: 2018-06-13

## 2018-06-13 DIAGNOSIS — D25.9 UTERINE LEIOMYOMA, UNSPECIFIED LOCATION: ICD-10-CM

## 2018-06-13 DIAGNOSIS — Z01.818 PREOP TESTING: Primary | ICD-10-CM

## 2018-06-13 PROCEDURE — 0UB90ZZ EXCISION OF UTERUS, OPEN APPROACH: ICD-10-PCS | Performed by: OBSTETRICS & GYNECOLOGY

## 2018-06-13 PROCEDURE — 58146 MYOMECTOMY ABDOM COMPLEX: CPT | Performed by: OBSTETRICS & GYNECOLOGY

## 2018-06-13 DEVICE — INTERCEED: Type: IMPLANTABLE DEVICE | Site: UTERUS | Status: FUNCTIONAL

## 2018-06-13 RX ORDER — HYDROMORPHONE HYDROCHLORIDE 1 MG/ML
0.6 INJECTION, SOLUTION INTRAMUSCULAR; INTRAVENOUS; SUBCUTANEOUS EVERY 5 MIN PRN
Status: DISCONTINUED | OUTPATIENT
Start: 2018-06-13 | End: 2018-06-13 | Stop reason: HOSPADM

## 2018-06-13 RX ORDER — HYDROMORPHONE HYDROCHLORIDE 1 MG/ML
0.2 INJECTION, SOLUTION INTRAMUSCULAR; INTRAVENOUS; SUBCUTANEOUS EVERY 5 MIN PRN
Status: DISCONTINUED | OUTPATIENT
Start: 2018-06-13 | End: 2018-06-13 | Stop reason: HOSPADM

## 2018-06-13 RX ORDER — ACETAMINOPHEN 325 MG/1
650 TABLET ORAL EVERY 4 HOURS PRN
Status: DISCONTINUED | OUTPATIENT
Start: 2018-06-13 | End: 2018-06-14

## 2018-06-13 RX ORDER — SODIUM CHLORIDE, SODIUM LACTATE, POTASSIUM CHLORIDE, CALCIUM CHLORIDE 600; 310; 30; 20 MG/100ML; MG/100ML; MG/100ML; MG/100ML
INJECTION, SOLUTION INTRAVENOUS CONTINUOUS
Status: DISCONTINUED | OUTPATIENT
Start: 2018-06-13 | End: 2018-06-13

## 2018-06-13 RX ORDER — HYDROMORPHONE HYDROCHLORIDE 1 MG/ML
0.4 INJECTION, SOLUTION INTRAMUSCULAR; INTRAVENOUS; SUBCUTANEOUS EVERY 5 MIN PRN
Status: DISCONTINUED | OUTPATIENT
Start: 2018-06-13 | End: 2018-06-13 | Stop reason: HOSPADM

## 2018-06-13 RX ORDER — LIDOCAINE HYDROCHLORIDE 10 MG/ML
INJECTION, SOLUTION EPIDURAL; INFILTRATION; INTRACAUDAL; PERINEURAL AS NEEDED
Status: DISCONTINUED | OUTPATIENT
Start: 2018-06-13 | End: 2018-06-13 | Stop reason: SURG

## 2018-06-13 RX ORDER — SODIUM CHLORIDE, SODIUM LACTATE, POTASSIUM CHLORIDE, CALCIUM CHLORIDE 600; 310; 30; 20 MG/100ML; MG/100ML; MG/100ML; MG/100ML
INJECTION, SOLUTION INTRAVENOUS CONTINUOUS
Status: DISCONTINUED | OUTPATIENT
Start: 2018-06-13 | End: 2018-06-14

## 2018-06-13 RX ORDER — HYDROCODONE BITARTRATE AND ACETAMINOPHEN 5; 325 MG/1; MG/1
2 TABLET ORAL AS NEEDED
Status: COMPLETED | OUTPATIENT
Start: 2018-06-13 | End: 2018-06-13

## 2018-06-13 RX ORDER — DEXAMETHASONE SODIUM PHOSPHATE 4 MG/ML
VIAL (ML) INJECTION AS NEEDED
Status: DISCONTINUED | OUTPATIENT
Start: 2018-06-13 | End: 2018-06-13 | Stop reason: SURG

## 2018-06-13 RX ORDER — HALOPERIDOL 5 MG/ML
0.25 INJECTION INTRAMUSCULAR ONCE AS NEEDED
Status: DISCONTINUED | OUTPATIENT
Start: 2018-06-13 | End: 2018-06-13 | Stop reason: HOSPADM

## 2018-06-13 RX ORDER — METOCLOPRAMIDE HYDROCHLORIDE 5 MG/ML
10 INJECTION INTRAMUSCULAR; INTRAVENOUS EVERY 8 HOURS PRN
Status: DISCONTINUED | OUTPATIENT
Start: 2018-06-13 | End: 2018-06-14

## 2018-06-13 RX ORDER — SODIUM CHLORIDE 0.9 % (FLUSH) 0.9 %
10 SYRINGE (ML) INJECTION AS NEEDED
Status: DISCONTINUED | OUTPATIENT
Start: 2018-06-13 | End: 2018-06-14

## 2018-06-13 RX ORDER — DOCUSATE SODIUM 100 MG/1
100 CAPSULE, LIQUID FILLED ORAL 2 TIMES DAILY
Status: DISCONTINUED | OUTPATIENT
Start: 2018-06-13 | End: 2018-06-14

## 2018-06-13 RX ORDER — ONDANSETRON 2 MG/ML
INJECTION INTRAMUSCULAR; INTRAVENOUS AS NEEDED
Status: DISCONTINUED | OUTPATIENT
Start: 2018-06-13 | End: 2018-06-13 | Stop reason: SURG

## 2018-06-13 RX ORDER — HYDROMORPHONE HYDROCHLORIDE 1 MG/ML
0.2 INJECTION, SOLUTION INTRAMUSCULAR; INTRAVENOUS; SUBCUTANEOUS EVERY 2 HOUR PRN
Status: DISCONTINUED | OUTPATIENT
Start: 2018-06-13 | End: 2018-06-14

## 2018-06-13 RX ORDER — SODIUM CHLORIDE, SODIUM LACTATE, POTASSIUM CHLORIDE, CALCIUM CHLORIDE 600; 310; 30; 20 MG/100ML; MG/100ML; MG/100ML; MG/100ML
INJECTION, SOLUTION INTRAVENOUS CONTINUOUS
Status: DISCONTINUED | OUTPATIENT
Start: 2018-06-13 | End: 2018-06-13 | Stop reason: HOSPADM

## 2018-06-13 RX ORDER — HYDROCODONE BITARTRATE AND ACETAMINOPHEN 5; 325 MG/1; MG/1
1 TABLET ORAL AS NEEDED
Status: COMPLETED | OUTPATIENT
Start: 2018-06-13 | End: 2018-06-13

## 2018-06-13 RX ORDER — DIPHENHYDRAMINE HYDROCHLORIDE 50 MG/ML
12.5 INJECTION INTRAMUSCULAR; INTRAVENOUS EVERY 4 HOURS PRN
Status: DISCONTINUED | OUTPATIENT
Start: 2018-06-13 | End: 2018-06-14

## 2018-06-13 RX ORDER — HYDROMORPHONE HYDROCHLORIDE 1 MG/ML
0.8 INJECTION, SOLUTION INTRAMUSCULAR; INTRAVENOUS; SUBCUTANEOUS EVERY 2 HOUR PRN
Status: DISCONTINUED | OUTPATIENT
Start: 2018-06-13 | End: 2018-06-14

## 2018-06-13 RX ORDER — SODIUM PHOSPHATE, DIBASIC AND SODIUM PHOSPHATE, MONOBASIC 7; 19 G/133ML; G/133ML
1 ENEMA RECTAL ONCE AS NEEDED
Status: DISCONTINUED | OUTPATIENT
Start: 2018-06-13 | End: 2018-06-14

## 2018-06-13 RX ORDER — GLYCOPYRROLATE 0.2 MG/ML
INJECTION INTRAMUSCULAR; INTRAVENOUS AS NEEDED
Status: DISCONTINUED | OUTPATIENT
Start: 2018-06-13 | End: 2018-06-13 | Stop reason: SURG

## 2018-06-13 RX ORDER — HYDROCODONE BITARTRATE AND ACETAMINOPHEN 7.5; 325 MG/1; MG/1
2 TABLET ORAL EVERY 4 HOURS PRN
Status: DISCONTINUED | OUTPATIENT
Start: 2018-06-13 | End: 2018-06-14

## 2018-06-13 RX ORDER — POLYETHYLENE GLYCOL 3350 17 G/17G
17 POWDER, FOR SOLUTION ORAL DAILY PRN
Status: DISCONTINUED | OUTPATIENT
Start: 2018-06-13 | End: 2018-06-14

## 2018-06-13 RX ORDER — ROCURONIUM BROMIDE 10 MG/ML
INJECTION, SOLUTION INTRAVENOUS AS NEEDED
Status: DISCONTINUED | OUTPATIENT
Start: 2018-06-13 | End: 2018-06-13 | Stop reason: SURG

## 2018-06-13 RX ORDER — NEOSTIGMINE METHYLSULFATE 0.5 MG/ML
INJECTION INTRAVENOUS AS NEEDED
Status: DISCONTINUED | OUTPATIENT
Start: 2018-06-13 | End: 2018-06-13 | Stop reason: SURG

## 2018-06-13 RX ORDER — ACETAMINOPHEN 500 MG
1000 TABLET ORAL ONCE
Status: COMPLETED | OUTPATIENT
Start: 2018-06-13 | End: 2018-06-13

## 2018-06-13 RX ORDER — BISACODYL 10 MG
10 SUPPOSITORY, RECTAL RECTAL
Status: DISCONTINUED | OUTPATIENT
Start: 2018-06-13 | End: 2018-06-14

## 2018-06-13 RX ORDER — NALOXONE HYDROCHLORIDE 0.4 MG/ML
80 INJECTION, SOLUTION INTRAMUSCULAR; INTRAVENOUS; SUBCUTANEOUS AS NEEDED
Status: DISCONTINUED | OUTPATIENT
Start: 2018-06-13 | End: 2018-06-13 | Stop reason: HOSPADM

## 2018-06-13 RX ORDER — CEFAZOLIN SODIUM 1 G/3ML
INJECTION, POWDER, FOR SOLUTION INTRAMUSCULAR; INTRAVENOUS AS NEEDED
Status: DISCONTINUED | OUTPATIENT
Start: 2018-06-13 | End: 2018-06-13 | Stop reason: SURG

## 2018-06-13 RX ORDER — ZOLPIDEM TARTRATE 5 MG/1
5 TABLET ORAL NIGHTLY PRN
Status: DISCONTINUED | OUTPATIENT
Start: 2018-06-13 | End: 2018-06-14

## 2018-06-13 RX ORDER — ENOXAPARIN SODIUM 100 MG/ML
40 INJECTION SUBCUTANEOUS DAILY
Status: DISCONTINUED | OUTPATIENT
Start: 2018-06-13 | End: 2018-06-14

## 2018-06-13 RX ORDER — ONDANSETRON 2 MG/ML
4 INJECTION INTRAMUSCULAR; INTRAVENOUS ONCE AS NEEDED
Status: DISCONTINUED | OUTPATIENT
Start: 2018-06-13 | End: 2018-06-13 | Stop reason: HOSPADM

## 2018-06-13 RX ORDER — KETOROLAC TROMETHAMINE 30 MG/ML
INJECTION, SOLUTION INTRAMUSCULAR; INTRAVENOUS AS NEEDED
Status: DISCONTINUED | OUTPATIENT
Start: 2018-06-13 | End: 2018-06-13 | Stop reason: SURG

## 2018-06-13 RX ORDER — HYDROCODONE BITARTRATE AND ACETAMINOPHEN 7.5; 325 MG/1; MG/1
1 TABLET ORAL EVERY 4 HOURS PRN
Status: DISCONTINUED | OUTPATIENT
Start: 2018-06-13 | End: 2018-06-14

## 2018-06-13 RX ORDER — HYDROMORPHONE HYDROCHLORIDE 1 MG/ML
0.4 INJECTION, SOLUTION INTRAMUSCULAR; INTRAVENOUS; SUBCUTANEOUS EVERY 2 HOUR PRN
Status: DISCONTINUED | OUTPATIENT
Start: 2018-06-13 | End: 2018-06-14

## 2018-06-13 RX ADMIN — GLYCOPYRROLATE 0.6 MG: 0.2 INJECTION INTRAMUSCULAR; INTRAVENOUS at 11:16:00

## 2018-06-13 RX ADMIN — KETOROLAC TROMETHAMINE 30 MG: 30 INJECTION, SOLUTION INTRAMUSCULAR; INTRAVENOUS at 11:10:00

## 2018-06-13 RX ADMIN — ROCURONIUM BROMIDE 40 MG: 10 INJECTION, SOLUTION INTRAVENOUS at 09:30:00

## 2018-06-13 RX ADMIN — ROCURONIUM BROMIDE 10 MG: 10 INJECTION, SOLUTION INTRAVENOUS at 10:44:00

## 2018-06-13 RX ADMIN — NEOSTIGMINE METHYLSULFATE 3 MG: 0.5 INJECTION INTRAVENOUS at 11:15:00

## 2018-06-13 RX ADMIN — LIDOCAINE HYDROCHLORIDE 25 MG: 10 INJECTION, SOLUTION EPIDURAL; INFILTRATION; INTRACAUDAL; PERINEURAL at 09:23:00

## 2018-06-13 RX ADMIN — CEFAZOLIN SODIUM 2 G: 1 INJECTION, POWDER, FOR SOLUTION INTRAMUSCULAR; INTRAVENOUS at 09:30:00

## 2018-06-13 RX ADMIN — SODIUM CHLORIDE, SODIUM LACTATE, POTASSIUM CHLORIDE, CALCIUM CHLORIDE: 600; 310; 30; 20 INJECTION, SOLUTION INTRAVENOUS at 09:19:00

## 2018-06-13 RX ADMIN — ONDANSETRON 4 MG: 2 INJECTION INTRAMUSCULAR; INTRAVENOUS at 11:09:00

## 2018-06-13 RX ADMIN — SODIUM CHLORIDE, SODIUM LACTATE, POTASSIUM CHLORIDE, CALCIUM CHLORIDE: 600; 310; 30; 20 INJECTION, SOLUTION INTRAVENOUS at 11:15:00

## 2018-06-13 RX ADMIN — DEXAMETHASONE SODIUM PHOSPHATE 4 MG: 4 MG/ML VIAL (ML) INJECTION at 11:09:00

## 2018-06-13 NOTE — INTERVAL H&P NOTE
Pre-op Diagnosis: Uterine leiomyoma, unspecified location [D25.9]    The above referenced H&P was reviewed by Tania Flores MD on 6/13/2018, the patient was examined and no significant changes have occurred in the patient's condition since the H&P was

## 2018-06-13 NOTE — PLAN OF CARE
Patient/Family Goals    • Patient/Family Long Term Goal Progressing    • Patient/Family Short Term Goal Progressing          Pt alert and oriented. Received from PACU. States pain controlled on po norco. [place] intact with yellow urine.  Myomectomy today wit

## 2018-06-13 NOTE — ANESTHESIA PREPROCEDURE EVALUATION
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    All of th

## 2018-06-13 NOTE — BRIEF OP NOTE
Pre-Operative Diagnosis: Uterine leiomyoma, unspecified location [D25.9]     Post-Operative Diagnosis: Uterine leiomyoma, unspecified location [D25.9]      Procedure Performed:   Procedure(s):  abdominal myomectomies    Surgeon(s) and Role:     * Racquel Powell

## 2018-06-13 NOTE — ANESTHESIA POSTPROCEDURE EVALUATION
Patient: Beth Pedlar    Procedure Summary     Date:  06/13/18 Room / Location:  [facility] OR 01 / [facility] OR    Anesthesia Start:  8817 Anesthesia Stop:  3515    Procedure:  LAPAROTOMY MYOMECTOMY (Bilateral Abdomen) Diagnosis:       Uterine leiomyoma,

## 2018-06-14 ENCOUNTER — TELEPHONE (OUTPATIENT)
Dept: OBGYN CLINIC | Facility: CLINIC | Age: 43
End: 2018-06-14

## 2018-06-14 VITALS
RESPIRATION RATE: 20 BRPM | BODY MASS INDEX: 24.29 KG/M2 | HEART RATE: 46 BPM | WEIGHT: 132 LBS | TEMPERATURE: 99 F | DIASTOLIC BLOOD PRESSURE: 91 MMHG | HEIGHT: 62 IN | OXYGEN SATURATION: 100 % | SYSTOLIC BLOOD PRESSURE: 140 MMHG

## 2018-06-14 PROBLEM — R10.2 PELVIC PRESSURE IN FEMALE: Status: ACTIVE | Noted: 2018-06-14

## 2018-06-14 RX ORDER — HYDROCODONE BITARTRATE AND ACETAMINOPHEN 7.5; 325 MG/1; MG/1
1 TABLET ORAL EVERY 4 HOURS PRN
Qty: 12 TABLET | Refills: 0 | Status: SHIPPED | OUTPATIENT
Start: 2018-06-14 | End: 2019-04-02

## 2018-06-14 RX ORDER — PSEUDOEPHEDRINE HCL 30 MG
100 TABLET ORAL 2 TIMES DAILY
Qty: 30 CAPSULE | Refills: 0 | Status: SHIPPED | OUTPATIENT
Start: 2018-06-14 | End: 2019-04-02

## 2018-06-14 NOTE — TELEPHONE ENCOUNTER
Suture removal scheduled for 06/18 9:30 am and follow up appointment with Dr. Kim Oviedo for 07/05 at 10 am.

## 2018-06-14 NOTE — PLAN OF CARE
Patient/Family Goals    • Patient/Family Long Term Goal Progressing    • Patient/Family Short Term Goal Progressing        Sushant discontinued this am.. Pt taking norco with some relief. Pt needs encouragement to be up and moving.

## 2018-06-14 NOTE — DISCHARGE SUMMARY
[facility]    GYN Discharge Note      Prasanna Goel Patient Status:  Inpatient    1975 MRN [id number]   Location [facility] 4W/SW/SE Attending Shashank Hastings MD   Hosp Day # 1 PCP Nabor Witt MD     Admit:18 dave Fish MD  6/14/2018  8:29 AM

## 2018-06-14 NOTE — OPERATIVE REPORT
[facility]    PATIENT'S NAME: Daviddariusz Pittleah   ATTENDING PHYSICIAN: Prema Crawford MD   OPERATING PHYSICIAN: Prema Crawford MD   PATIENT ACCOUNT#:   [id number]    LOCATION:  [address] [place] [address] #:   [id number]       [address] [place] [address] standard manner, and a low transverse Pfannenstiel incision was made, wide enough and generously enough to allow the surgery to be able to be accomplished, given the large size of the fibroids.   The anterior rectus fascia was incised transversely and sharp were some pedunculated fibroids, as well, that were removed with Bovie cautery along the base and any suturing, if needed, based on the amount of bleeding noted. Irrigation was performed and was clear.   The uterus at the conclusion of the multiple myomect

## 2018-06-14 NOTE — TELEPHONE ENCOUNTER
Confirmation , surgery was done.  Form was faxed to office please fill out and fax back to [phone]

## 2018-06-15 NOTE — TELEPHONE ENCOUNTER
Left message for pt to have HIPAA signed at next visit (6/18) and to call back-[phone].  [facility])

## 2018-06-15 NOTE — TELEPHONE ENCOUNTER
Pt was made aware of this information through email communication on [place]. Had no further questions.

## 2018-06-18 ENCOUNTER — TELEPHONE (OUTPATIENT)
Dept: OBGYN CLINIC | Facility: CLINIC | Age: 43
End: 2018-06-18

## 2018-06-18 ENCOUNTER — NURSE ONLY (OUTPATIENT)
Dept: OBGYN CLINIC | Facility: CLINIC | Age: 43
End: 2018-06-18

## 2018-06-18 VITALS — SYSTOLIC BLOOD PRESSURE: 128 MMHG | TEMPERATURE: 99 F | DIASTOLIC BLOOD PRESSURE: 80 MMHG

## 2018-06-18 DIAGNOSIS — Z48.02 ENCOUNTER FOR STAPLE REMOVAL: Primary | ICD-10-CM

## 2018-06-18 NOTE — TELEPHONE ENCOUNTER
[employer] form for Dr. Staci Thurston received in LU[employer]+ FCR+ Signed release, pt paid $25 with . Logged for processing.  NK

## 2018-06-18 NOTE — TELEPHONE ENCOUNTER
Patient dropped off paper to be completed. Paid $25.00 fee. She asked to please fax to # on documentation.

## 2018-06-19 NOTE — TELEPHONE ENCOUNTER
Dr. Sarah Couch (2 forms)    Please sign off on form:  -Highlight the patient and hit \"Chart\" button. -In Chart Review, w/in the Encounter tab - click 1 time on the Telephone call encounter for 6-18-18.  Scroll down the telephone encounter.  -Click \"scan on\"

## 2018-06-20 NOTE — TELEPHONE ENCOUNTER
FMLA and disability forms faxed to [phone]. Originals mailed to patient. Request complete.

## 2018-07-05 ENCOUNTER — OFFICE VISIT (OUTPATIENT)
Dept: OBGYN CLINIC | Facility: CLINIC | Age: 43
End: 2018-07-05

## 2018-07-05 VITALS — DIASTOLIC BLOOD PRESSURE: 78 MMHG | BODY MASS INDEX: 25 KG/M2 | SYSTOLIC BLOOD PRESSURE: 122 MMHG | WEIGHT: 135 LBS

## 2018-07-05 DIAGNOSIS — Z98.890 POSTOPERATIVE STATE: Primary | ICD-10-CM

## 2018-07-05 PROBLEM — D25.1 INTRAMURAL, SUBMUCOUS, AND SUBSEROUS LEIOMYOMA OF UTERUS: Status: RESOLVED | Noted: 2018-05-29 | Resolved: 2018-07-05

## 2018-07-05 PROBLEM — D25.9 UTERINE LEIOMYOMA: Status: RESOLVED | Noted: 2018-05-31 | Resolved: 2018-07-05

## 2018-07-05 PROBLEM — R10.2 PELVIC PRESSURE IN FEMALE: Status: RESOLVED | Noted: 2018-06-14 | Resolved: 2018-07-05

## 2018-07-05 PROBLEM — D25.2 INTRAMURAL, SUBMUCOUS, AND SUBSEROUS LEIOMYOMA OF UTERUS: Status: RESOLVED | Noted: 2018-05-29 | Resolved: 2018-07-05

## 2018-07-05 PROBLEM — D25.9 FIBROID UTERUS: Status: RESOLVED | Noted: 2018-06-13 | Resolved: 2018-07-05

## 2018-07-05 PROBLEM — D25.0 INTRAMURAL, SUBMUCOUS, AND SUBSEROUS LEIOMYOMA OF UTERUS: Status: RESOLVED | Noted: 2018-05-29 | Resolved: 2018-07-05

## 2018-07-05 PROCEDURE — 99024 POSTOP FOLLOW-UP VISIT: CPT | Performed by: OBSTETRICS & GYNECOLOGY

## 2018-07-05 NOTE — TELEPHONE ENCOUNTER
Good afternoon Dr. Clint Shanks    Patient had post-op visit today and states you approved extension for  FMLA and disability leave. I can revise the forms, what date should she return to work? We stated 4-6 wks on original paperwork.      Thanks  Bell Rodriges

## 2018-07-05 NOTE — PROGRESS NOTES
HPI:    Patient ID: Mary Murcia is a 36year old female. HPI  Postop   S/p large abdominal myomectomy, 10 cm. 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   Pt has pelvic discomforts and pressure with activity and when she touches in

## 2018-07-09 NOTE — TELEPHONE ENCOUNTER
Revised FMLA and disability forms faxed to [employer]. Originals mailed to patient. Request complete.

## 2018-08-29 ENCOUNTER — TELEPHONE (OUTPATIENT)
Dept: OBGYN CLINIC | Facility: CLINIC | Age: 43
End: 2018-08-29

## 2018-08-30 NOTE — TELEPHONE ENCOUNTER
Note      From: Randy Peace  To: Kenneth Flores MD  Sent: 8/27/2018  9:23 AM CDT  Subject: Visit Follow-up Question    Hello,  My yearly Mammo is due next month.  I was wondering if Dr. Clint Shanks can put an order in for me to get my annual breast exam.

## 2018-08-31 ENCOUNTER — TELEPHONE (OUTPATIENT)
Dept: OBGYN CLINIC | Facility: CLINIC | Age: 43
End: 2018-08-31

## 2018-08-31 NOTE — TELEPHONE ENCOUNTER
Pt returning nurse call, patient works at [employer] ok to call her at ext [phone]

## 2018-08-31 NOTE — TELEPHONE ENCOUNTER
----- Message from Randy He sent at 8/31/2018  2:34 PM CDT -----  Regarding: RE: Test Results Question  Contact: [phone]  Yes, I understand that. It's the insurance needing that information. Can you resubmit the diagnosis for these labs.  There

## 2018-08-31 NOTE — TELEPHONE ENCOUNTER
RN placed call to patient. Will request form or contact information for connecting directly with Jaspal Hatch [address] regarding billing issue.

## 2018-08-31 NOTE — TELEPHONE ENCOUNTER
RN returned call to patient. Per patient will provide number for insurer so that our office can follow up regarding lab charges. Awaiting return call from pt with information.

## 2018-09-05 NOTE — TELEPHONE ENCOUNTER
From  Prasanna Links To Sent  2018  7:44 AM   Jalen Carter,     Here is the information you requested to help me out with these lab tests for [employer] to cover costs. [employer] number is [phone]. I believe you just need to give them my name and .

## 2018-09-06 NOTE — TELEPHONE ENCOUNTER
RN placed call to insurer was advised that claim has since been adjusted. Rep unable to clarify if clinical information still needed. RN to advise patient to contact insurer to check on status of claim.   If additional assistance needed, pt to return call

## 2018-09-11 NOTE — TELEPHONE ENCOUNTER
RN received documents from patient accounts rep, Heavenly Bergeron ([id number]) for MD to complete documenting medical necessity for tests indicated below.   This RN faxed forms to pathology at [phone] to the attention of Dr. William Vogt for him to review and com

## 2018-09-12 NOTE — TELEPHONE ENCOUNTER
RN received call from pathology dept advising that Dr. Durant Fear unable to complete forms as he does not enter orders or keep medical records for patients. Caller advises that forms need to be routed to ordering provider for completion.       RN routing to D

## 2018-09-13 NOTE — TELEPHONE ENCOUNTER
AJB provided statement of medical necessity on forms provided. This RN faxed to Keith Bernabe in patient accounts for follow up. Document sent to scan.

## 2018-09-22 ENCOUNTER — HOSPITAL ENCOUNTER (OUTPATIENT)
Dept: MAMMOGRAPHY | Facility: HOSPITAL | Age: 43
Discharge: HOME | End: 2018-09-22
Attending: OBSTETRICS & GYNECOLOGY
Payer: COMMERCIAL

## 2018-09-22 DIAGNOSIS — Z12.39 SPECIAL SCREENING EXAMINATION FOR NEOPLASM OF BREAST: ICD-10-CM

## 2018-09-22 PROCEDURE — 77067 SCR MAMMO BI INCL CAD: CPT | Performed by: OBSTETRICS & GYNECOLOGY

## 2018-10-07 ENCOUNTER — TELEPHONE (OUTPATIENT)
Dept: OBGYN CLINIC | Facility: CLINIC | Age: 43
End: 2018-10-07

## 2018-10-08 NOTE — TELEPHONE ENCOUNTER
Please help pt schedule appt with Dr Catherine Wright. He did pt's surgery. See routing note from [address], she wants the 11/3 appt cancelled as the pt sees Catherine Wright group, not us.

## 2018-10-16 ENCOUNTER — HOSPITAL ENCOUNTER (OUTPATIENT)
Dept: MAMMOGRAPHY | Facility: HOSPITAL | Age: 43
Discharge: HOME | End: 2018-10-16
Attending: OBSTETRICS & GYNECOLOGY
Payer: COMMERCIAL

## 2018-10-16 DIAGNOSIS — R92.8 ABNORMAL MAMMOGRAM: ICD-10-CM

## 2018-11-10 ENCOUNTER — OFFICE VISIT (OUTPATIENT)
Dept: OBGYN CLINIC | Facility: CLINIC | Age: 43
End: 2018-11-10
Payer: COMMERCIAL

## 2018-11-10 VITALS
DIASTOLIC BLOOD PRESSURE: 93 MMHG | BODY MASS INDEX: 26 KG/M2 | HEART RATE: 76 BPM | WEIGHT: 142 LBS | SYSTOLIC BLOOD PRESSURE: 133 MMHG

## 2018-11-10 DIAGNOSIS — Z01.419 WOMEN'S ANNUAL ROUTINE GYNECOLOGICAL EXAMINATION: Primary | ICD-10-CM

## 2018-11-10 PROBLEM — R51.9 PERSISTENT HEADACHES: Status: RESOLVED | Noted: 2017-05-15 | Resolved: 2018-11-10

## 2018-11-10 PROBLEM — Z98.890 POSTOPERATIVE STATE: Status: RESOLVED | Noted: 2018-07-05 | Resolved: 2018-11-10

## 2018-11-10 PROCEDURE — 99396 PREV VISIT EST AGE 40-64: CPT | Performed by: OBSTETRICS & GYNECOLOGY

## 2018-11-10 NOTE — PROGRESS NOTES
HPI:    Patient ID: Balaji Miguel is a 39year old female. HPI  Well woman  Feeling well. Had surgery in June, multiple abdominal myomectomies totaling 9. Including one very large one. She feels much better.   No longer having the pelvic and pressu tobacco: Never Used    Alcohol use:  Yes      Alcohol/week: 0.0 oz      Comment: OCC    Drug use: No       PHYSICAL EXAM:    Physical Exam  Vitals: BP (!) 133/93   Pulse 76   Wt 142 lb (64.4 kg)   LMP 11/01/2018 (Exact Date)   BMI 25.97 kg/m²     [facility]a and minora without lesions. Vagina- normal, no lesions or discharge. Moist and well supported. Bladder-  nontender. No masses. Normal support. No evidence of cystocele,  abnormal bladder neck mobility or evident urinary incontinence.   C

## 2018-11-30 PROCEDURE — 87086 URINE CULTURE/COLONY COUNT: CPT | Performed by: NURSE PRACTITIONER

## 2019-01-06 ENCOUNTER — HOSPITAL ENCOUNTER (OUTPATIENT)
Age: 44
Discharge: HOME | End: 2019-01-06
Payer: COMMERCIAL

## 2019-01-06 VITALS
DIASTOLIC BLOOD PRESSURE: 96 MMHG | TEMPERATURE: 99 F | RESPIRATION RATE: 18 BRPM | HEART RATE: 98 BPM | SYSTOLIC BLOOD PRESSURE: 145 MMHG | OXYGEN SATURATION: 100 %

## 2019-01-06 DIAGNOSIS — H10.33 ACUTE BACTERIAL CONJUNCTIVITIS OF BOTH EYES: Primary | ICD-10-CM

## 2019-01-06 PROCEDURE — 99213 OFFICE O/P EST LOW 20 MIN: CPT

## 2019-01-06 PROCEDURE — 99204 OFFICE O/P NEW MOD 45 MIN: CPT

## 2019-01-06 RX ORDER — POLYMYXIN B SULFATE AND TRIMETHOPRIM 1; 10000 MG/ML; [USP'U]/ML
1 SOLUTION OPHTHALMIC
Qty: 10 ML | Refills: 0 | Status: SHIPPED | OUTPATIENT
Start: 2019-01-06 | End: 2019-01-11

## 2019-01-06 NOTE — ED PROVIDER NOTES
Patient presents with:  Eye Problem      HPI:     Bette Hamilton is a 39year old female with no significant past medical history presents with redness and irritation bilateral eyes that started 2 days ago. Positive drainage.   Patient denies any pain wi types were placed in this encounter. Labs performed this visit:  No results found for this or any previous visit (from the past 10 hour(s)). Diagnosis:    ICD-10-CM    1.  Acute bacterial conjunctivitis of both eyes H10.33        All results reviewe

## 2019-01-06 NOTE — ED INITIAL ASSESSMENT (HPI)
PATIENT ARRIVED AMBULATORY TO ROOM C/O BILATERAL EYE REDNESS/IRRITATION THAT STARTED 2 DAYS AGO. +DRAINAGE. NO VISION CHANGES. PATIENT DOES NOT WEAR CONTACT LENSES. DENIES THE USE OF ANY NEW PRODUCTS.

## 2019-04-02 ENCOUNTER — OFFICE VISIT (OUTPATIENT)
Dept: OBGYN CLINIC | Facility: CLINIC | Age: 44
End: 2019-04-02
Payer: COMMERCIAL

## 2019-04-02 VITALS
WEIGHT: 140 LBS | HEART RATE: 66 BPM | BODY MASS INDEX: 26 KG/M2 | DIASTOLIC BLOOD PRESSURE: 93 MMHG | SYSTOLIC BLOOD PRESSURE: 131 MMHG

## 2019-04-02 DIAGNOSIS — R10.9 ABDOMINAL WALL PAIN: Primary | ICD-10-CM

## 2019-04-02 PROCEDURE — 99213 OFFICE O/P EST LOW 20 MIN: CPT | Performed by: OBSTETRICS & GYNECOLOGY

## 2019-04-02 NOTE — PROGRESS NOTES
HPI:    Patient ID: Prasanna Goel is a 39year old female. HPI  GYN problem visit  Complains of a sharp left lower quadrant abdominal wall pain that occurs only when she exercises or walks a lot.   It hurts her and prevents her from exercising as much Thyroid disease Paternal Aunt    • Cancer Neg       Social History: Social History    Tobacco Use      Smoking status: Never Smoker      Smokeless tobacco: Never Used    Alcohol use:  Yes      Alcohol/week: 0.0 oz      Comment: OCC    Drug use: No       PHY

## 2019-06-05 ENCOUNTER — TELEPHONE (OUTPATIENT)
Dept: INTERNAL MEDICINE CLINIC | Facility: CLINIC | Age: 44
End: 2019-06-05

## 2019-06-06 NOTE — TELEPHONE ENCOUNTER
JAVAN for pt - MD would like pt to clint appt to be evaluated before he can completed the form. Awaiting call back. Form is on Dr. Concha Martínezigham desk.

## 2019-06-06 NOTE — TELEPHONE ENCOUNTER
Patient called and left detailed message stating Dr Nonie Gaucher would like her to schedule an appointment to fill out faxed form that he received from [facility]. Form placed on Dr Torres Linear nurse desk.

## 2019-06-10 NOTE — TELEPHONE ENCOUNTER
Will close this encounter. Noted that patient has an appt scheduled to see Dr Chemo Jung on 6/11/19.

## 2019-06-11 ENCOUNTER — OFFICE VISIT (OUTPATIENT)
Dept: INTERNAL MEDICINE CLINIC | Facility: CLINIC | Age: 44
End: 2019-06-11
Payer: COMMERCIAL

## 2019-06-11 ENCOUNTER — LAB ENCOUNTER (OUTPATIENT)
Dept: LAB | Age: 44
End: 2019-06-11
Attending: INTERNAL MEDICINE
Payer: COMMERCIAL

## 2019-06-11 DIAGNOSIS — I10 ESSENTIAL HYPERTENSION: Primary | ICD-10-CM

## 2019-06-11 DIAGNOSIS — N97.9 INFERTILITY, FEMALE: ICD-10-CM

## 2019-06-11 PROCEDURE — 87340 HEPATITIS B SURFACE AG IA: CPT

## 2019-06-11 PROCEDURE — 86901 BLOOD TYPING SEROLOGIC RH(D): CPT

## 2019-06-11 PROCEDURE — 87389 HIV-1 AG W/HIV-1&-2 AB AG IA: CPT

## 2019-06-11 PROCEDURE — 84443 ASSAY THYROID STIM HORMONE: CPT

## 2019-06-11 PROCEDURE — 36415 COLL VENOUS BLD VENIPUNCTURE: CPT

## 2019-06-11 PROCEDURE — 85025 COMPLETE CBC W/AUTO DIFF WBC: CPT

## 2019-06-11 PROCEDURE — 87491 CHLMYD TRACH DNA AMP PROBE: CPT

## 2019-06-11 PROCEDURE — 86780 TREPONEMA PALLIDUM: CPT

## 2019-06-11 PROCEDURE — 84146 ASSAY OF PROLACTIN: CPT

## 2019-06-11 PROCEDURE — 84439 ASSAY OF FREE THYROXINE: CPT

## 2019-06-11 PROCEDURE — 99212 OFFICE O/P EST SF 10 MIN: CPT | Performed by: INTERNAL MEDICINE

## 2019-06-11 PROCEDURE — 86762 RUBELLA ANTIBODY: CPT

## 2019-06-11 PROCEDURE — 86787 VARICELLA-ZOSTER ANTIBODY: CPT

## 2019-06-11 PROCEDURE — 86900 BLOOD TYPING SEROLOGIC ABO: CPT

## 2019-06-11 PROCEDURE — 86376 MICROSOMAL ANTIBODY EACH: CPT | Performed by: INTERNAL MEDICINE

## 2019-06-11 PROCEDURE — 86800 THYROGLOBULIN ANTIBODY: CPT | Performed by: INTERNAL MEDICINE

## 2019-06-11 PROCEDURE — 86803 HEPATITIS C AB TEST: CPT

## 2019-06-11 PROCEDURE — 82306 VITAMIN D 25 HYDROXY: CPT

## 2019-06-11 PROCEDURE — 86765 RUBEOLA ANTIBODY: CPT

## 2019-06-11 PROCEDURE — 99213 OFFICE O/P EST LOW 20 MIN: CPT | Performed by: INTERNAL MEDICINE

## 2019-06-11 PROCEDURE — 87591 N.GONORRHOEAE DNA AMP PROB: CPT

## 2019-06-11 PROCEDURE — 86850 RBC ANTIBODY SCREEN: CPT

## 2019-06-11 RX ORDER — NIFEDIPINE 30 MG/1
30 TABLET, EXTENDED RELEASE ORAL DAILY
Qty: 90 TABLET | Refills: 0 | Status: SHIPPED | OUTPATIENT
Start: 2019-06-11 | End: 2019-09-11

## 2019-06-12 VITALS
SYSTOLIC BLOOD PRESSURE: 140 MMHG | WEIGHT: 136.81 LBS | OXYGEN SATURATION: 99 % | HEART RATE: 67 BPM | DIASTOLIC BLOOD PRESSURE: 92 MMHG | HEIGHT: 62 IN | TEMPERATURE: 98 F | BODY MASS INDEX: 25.18 KG/M2

## 2019-06-12 NOTE — PROGRESS NOTES
HPI:    Patient ID: Юлия Francis is a 39year old female. Patient presents for follow-up. Patient is currently seeing fertility specialist and undergoing fertility work-up.   She was noted to have elevated blood pressure on several visits and was re salt and working on. Weight management through good dietary habits and regular exercise, will need to start pharmacological treatment at this time.   Will start patient on Procardia XL 30 mg daily--this will be safe to use if patient should become pregnant

## 2019-06-17 ENCOUNTER — NURSE ONLY (OUTPATIENT)
Dept: INTERNAL MEDICINE CLINIC | Facility: CLINIC | Age: 44
End: 2019-06-17
Payer: COMMERCIAL

## 2019-06-17 DIAGNOSIS — Z23 NEED FOR MMR VACCINE: ICD-10-CM

## 2019-06-17 PROCEDURE — 90471 IMMUNIZATION ADMIN: CPT | Performed by: INTERNAL MEDICINE

## 2019-06-17 PROCEDURE — 90707 MMR VACCINE SC: CPT | Performed by: INTERNAL MEDICINE

## 2019-06-17 NOTE — PROGRESS NOTES
Patient present for MMR vaccine per Dr Jimenez Earbrayan. Patient's name and  verified. Injection well tolerated to left arm with adverse reactions noted.

## 2019-08-27 ENCOUNTER — TELEPHONE (OUTPATIENT)
Dept: INTERNAL MEDICINE CLINIC | Facility: CLINIC | Age: 44
End: 2019-08-27

## 2019-08-27 DIAGNOSIS — Z12.39 ENCOUNTER FOR SCREENING FOR MALIGNANT NEOPLASM OF BREAST: Primary | ICD-10-CM

## 2019-08-27 NOTE — TELEPHONE ENCOUNTER
Pt put a message in the system requesting an order for a mammogram.    Please call pt when complete Tasked to nursing

## 2019-08-28 NOTE — TELEPHONE ENCOUNTER
Patient sent the following note:  So I was trying to schedule my Mammogram today and they are asking if it's a diagnostic exam or screening. They need the code to match the order.  Do you understand that question because I'm not sure what that means.  She

## 2019-08-29 NOTE — TELEPHONE ENCOUNTER
Spoke with pt to inform her order was for Diagnostic but that they should also be able to pull up her order via EMR / [email or web address]. I apologized for her troubles earlier.

## 2019-09-05 ENCOUNTER — HOSPITAL ENCOUNTER (OUTPATIENT)
Dept: MAMMOGRAPHY | Facility: HOSPITAL | Age: 44
Discharge: HOME | End: 2019-09-05
Attending: INTERNAL MEDICINE
Payer: COMMERCIAL

## 2019-09-05 DIAGNOSIS — Z12.39 ENCOUNTER FOR SCREENING FOR MALIGNANT NEOPLASM OF BREAST: ICD-10-CM

## 2019-09-05 PROCEDURE — 77066 DX MAMMO INCL CAD BI: CPT | Performed by: INTERNAL MEDICINE

## 2019-09-05 PROCEDURE — 77062 BREAST TOMOSYNTHESIS BI: CPT | Performed by: INTERNAL MEDICINE

## 2019-09-11 RX ORDER — NIFEDIPINE 30 MG/1
TABLET, EXTENDED RELEASE ORAL
Qty: 90 TABLET | Refills: 3 | Status: SHIPPED | OUTPATIENT
Start: 2019-09-11 | End: 2019-10-09 | Stop reason: ALTCHOICE

## 2019-09-11 NOTE — TELEPHONE ENCOUNTER
Per TE 6/17/19: \"Already BP shows significant improvement. Continue with same Procardia XL. \"    Refill request is for a maintenance medication and has met the criteria specified in the Ambulatory Medication Refill Standing Order for eligibility, visits

## 2019-10-09 ENCOUNTER — OFFICE VISIT (OUTPATIENT)
Dept: INTERNAL MEDICINE CLINIC | Facility: CLINIC | Age: 44
End: 2019-10-09
Payer: COMMERCIAL

## 2019-10-09 ENCOUNTER — LAB ENCOUNTER (OUTPATIENT)
Dept: LAB | Age: 44
End: 2019-10-09
Attending: INTERNAL MEDICINE
Payer: COMMERCIAL

## 2019-10-09 VITALS
RESPIRATION RATE: 14 BRPM | WEIGHT: 134 LBS | TEMPERATURE: 98 F | OXYGEN SATURATION: 98 % | DIASTOLIC BLOOD PRESSURE: 84 MMHG | HEART RATE: 57 BPM | BODY MASS INDEX: 25 KG/M2 | SYSTOLIC BLOOD PRESSURE: 120 MMHG

## 2019-10-09 DIAGNOSIS — G89.29 CHRONIC PAIN OF BOTH SHOULDERS: ICD-10-CM

## 2019-10-09 DIAGNOSIS — M25.512 CHRONIC PAIN OF BOTH SHOULDERS: ICD-10-CM

## 2019-10-09 DIAGNOSIS — N60.19 FIBROCYSTIC BREAST DISEASE (FCBD), UNSPECIFIED LATERALITY: ICD-10-CM

## 2019-10-09 DIAGNOSIS — M25.511 CHRONIC PAIN OF BOTH SHOULDERS: ICD-10-CM

## 2019-10-09 DIAGNOSIS — Z00.00 PHYSICAL EXAM: ICD-10-CM

## 2019-10-09 DIAGNOSIS — N97.9 INFERTILITY, FEMALE: ICD-10-CM

## 2019-10-09 DIAGNOSIS — Z00.00 PHYSICAL EXAM: Primary | ICD-10-CM

## 2019-10-09 PROCEDURE — 99396 PREV VISIT EST AGE 40-64: CPT | Performed by: INTERNAL MEDICINE

## 2019-10-09 PROCEDURE — 83036 HEMOGLOBIN GLYCOSYLATED A1C: CPT

## 2019-10-09 PROCEDURE — 85025 COMPLETE CBC W/AUTO DIFF WBC: CPT

## 2019-10-09 PROCEDURE — 82306 VITAMIN D 25 HYDROXY: CPT

## 2019-10-09 PROCEDURE — 84443 ASSAY THYROID STIM HORMONE: CPT

## 2019-10-09 PROCEDURE — 80053 COMPREHEN METABOLIC PANEL: CPT

## 2019-10-09 PROCEDURE — 93000 ELECTROCARDIOGRAM COMPLETE: CPT | Performed by: INTERNAL MEDICINE

## 2019-10-09 PROCEDURE — 36415 COLL VENOUS BLD VENIPUNCTURE: CPT

## 2019-10-09 PROCEDURE — 80061 LIPID PANEL: CPT

## 2019-10-09 RX ORDER — MULTIVIT-MIN/IRON/FOLIC ACID/K 18-600-40
CAPSULE ORAL
COMMUNITY
End: 2020-09-29 | Stop reason: ALTCHOICE

## 2019-10-10 ENCOUNTER — TELEPHONE (OUTPATIENT)
Dept: PHYSICAL THERAPY | Facility: HOSPITAL | Age: 44
End: 2019-10-10

## 2019-10-18 ENCOUNTER — APPOINTMENT (OUTPATIENT)
Dept: PHYSICAL THERAPY | Age: 44
End: 2019-10-18
Attending: INTERNAL MEDICINE
Payer: COMMERCIAL

## 2019-10-23 ENCOUNTER — OFFICE VISIT (OUTPATIENT)
Dept: PHYSICAL THERAPY | Age: 44
End: 2019-10-23
Attending: INTERNAL MEDICINE
Payer: COMMERCIAL

## 2019-10-23 DIAGNOSIS — M25.512 CHRONIC PAIN OF BOTH SHOULDERS: ICD-10-CM

## 2019-10-23 DIAGNOSIS — G89.29 CHRONIC PAIN OF BOTH SHOULDERS: ICD-10-CM

## 2019-10-23 DIAGNOSIS — M25.511 CHRONIC PAIN OF BOTH SHOULDERS: ICD-10-CM

## 2019-10-23 PROCEDURE — 97161 PT EVAL LOW COMPLEX 20 MIN: CPT | Performed by: PHYSICAL THERAPIST

## 2019-10-23 PROCEDURE — 97110 THERAPEUTIC EXERCISES: CPT | Performed by: PHYSICAL THERAPIST

## 2019-10-23 NOTE — PROGRESS NOTES
SHOULDER EVALUATION:   Referring Physician: Dr. Summer Lares  Diagnosis: Chronic pain of both shoulders (M25.511,G89.29,M25.512) Date of Service: 10/23/2019     PATIENT SUMMARY   Hal Armstrong is a 38year old female who presents to therapy today with complai 140*  Abduction: R 150*; L 150*  ER: R T2*; L T2*  IR: R T9*; L T9*   C/o pain c B shoulder PROM which is grossly WFL.     Flexibility: possible pec tightness    Strength/MMT: (* denotes performed with pain)  Shoulder * pain c all MMT   Flexion: R 4/5; L 4/ Exercise, Home Exercise Program instruction and Modalities to include: MHP, cold pack    Education or treatment limitation: None  Rehab Potential:good    FOTO: -/100    Patient/Family/Caregiver was advised of these findings, precautions, and treatment opti

## 2019-10-30 ENCOUNTER — OFFICE VISIT (OUTPATIENT)
Dept: PHYSICAL THERAPY | Age: 44
End: 2019-10-30
Attending: INTERNAL MEDICINE
Payer: COMMERCIAL

## 2019-10-30 DIAGNOSIS — M25.511 CHRONIC PAIN OF BOTH SHOULDERS: ICD-10-CM

## 2019-10-30 DIAGNOSIS — M25.512 CHRONIC PAIN OF BOTH SHOULDERS: ICD-10-CM

## 2019-10-30 DIAGNOSIS — G89.29 CHRONIC PAIN OF BOTH SHOULDERS: ICD-10-CM

## 2019-10-30 PROCEDURE — 97140 MANUAL THERAPY 1/> REGIONS: CPT | Performed by: PHYSICAL THERAPIST

## 2019-10-30 PROCEDURE — 97110 THERAPEUTIC EXERCISES: CPT | Performed by: PHYSICAL THERAPIST

## 2019-10-30 NOTE — PROGRESS NOTES
Dx:      Diagnosis: Chronic pain of both shoulders (M25.511,G89.29,M25.512)     Insurance (Authorized # of Visits):  2/8  Authorizing Physician: Dr. Justyna Roberts MD visit: none scheduled  Fall Risk: standard         Precautions: n/a           Subjective:  Sh instruction/review/modification:  posterior capsule stretch/ orSleeper's stretch, seated thoracic spine ext (c/s ball ), doorway pec stretch, B shoulder ER AROM or scapular retraction, wall slides ( or supine; d/c L UE painful).  Add RTB resisted L/R ER x 2

## 2019-11-04 ENCOUNTER — TELEPHONE (OUTPATIENT)
Dept: PHYSICAL THERAPY | Age: 44
End: 2019-11-04

## 2019-11-07 ENCOUNTER — PATIENT MESSAGE (OUTPATIENT)
Dept: OBGYN CLINIC | Facility: CLINIC | Age: 44
End: 2019-11-07

## 2019-11-07 ENCOUNTER — PATIENT MESSAGE (OUTPATIENT)
Dept: INTERNAL MEDICINE CLINIC | Facility: CLINIC | Age: 44
End: 2019-11-07

## 2019-11-07 NOTE — TELEPHONE ENCOUNTER
Annamaria Senior, RN 11/7/2019 9:57 AM CST      ----- Message -----  From: Balaji Miguel  Sent: 11/7/2019 9:57 AM CST  To: Rebekah Ng Ob/Gyne Clinical Staff  Subject: Other     Hello,   I was wondering if there is a Maternal Fetal Medicine doctor rupert

## 2019-11-07 NOTE — TELEPHONE ENCOUNTER
From: Milly Shaffer  To: Norm Blandon MD  Sent: 11/7/2019 9:52 AM CST  Subject: Other    Hello,   I was there 2weeks ago or so for my follow up. I was wondering if I can have my form filled out for my insurance. It's just entering my lab info.  and M

## 2019-11-08 ENCOUNTER — OFFICE VISIT (OUTPATIENT)
Dept: PHYSICAL THERAPY | Age: 44
End: 2019-11-08
Attending: INTERNAL MEDICINE
Payer: COMMERCIAL

## 2019-11-08 DIAGNOSIS — G89.29 CHRONIC PAIN OF BOTH SHOULDERS: ICD-10-CM

## 2019-11-08 DIAGNOSIS — M25.511 CHRONIC PAIN OF BOTH SHOULDERS: ICD-10-CM

## 2019-11-08 DIAGNOSIS — M25.512 CHRONIC PAIN OF BOTH SHOULDERS: ICD-10-CM

## 2019-11-08 PROCEDURE — 97035 APP MDLTY 1+ULTRASOUND EA 15: CPT | Performed by: PHYSICAL THERAPIST

## 2019-11-08 PROCEDURE — 97110 THERAPEUTIC EXERCISES: CPT | Performed by: PHYSICAL THERAPIST

## 2019-11-08 PROCEDURE — 97140 MANUAL THERAPY 1/> REGIONS: CPT | Performed by: PHYSICAL THERAPIST

## 2019-11-08 NOTE — PROGRESS NOTES
Dx:      Diagnosis: Chronic pain of both shoulders (M25.511,G89.29,M25.512)     Insurance (Authorized # of Visits):  3/8  Authorizing Physician: Dr. Andie Sandoval  Next MD visit: none scheduled  Fall Risk: standard         Precautions: n/a           Subjective:  W below.  Isometric L/R shoulder ER x 10 reps each         Manual:   B pec stretch 3 x 20 sec each; L/R GH posterior and inferior glides grade II-III; L/R posterior capsule/IR stretch 4 x 15 sec each; mid thoracic spine P/A mobilization grade II Manual:   B

## 2019-11-11 ENCOUNTER — APPOINTMENT (OUTPATIENT)
Dept: PHYSICAL THERAPY | Age: 44
End: 2019-11-11
Attending: INTERNAL MEDICINE
Payer: COMMERCIAL

## 2019-11-11 ENCOUNTER — PATIENT MESSAGE (OUTPATIENT)
Dept: OBGYN CLINIC | Facility: CLINIC | Age: 44
End: 2019-11-11

## 2019-11-11 DIAGNOSIS — Z31.69 PRE-CONCEPTION COUNSELING: Primary | ICD-10-CM

## 2019-11-11 NOTE — TELEPHONE ENCOUNTER
Please provide a referral to [facility] Dr. Tiesha Coleman as requested by her fertility clinic for preconceptional counseling.

## 2019-11-11 NOTE — TELEPHONE ENCOUNTER
Memo Rose RN 11/11/2019 9:51 AM CST      ----- Message -----  From: March Ice  Sent: 11/11/2019 9:38 AM CST  To: Rebekah Ng Ob/Gyne Clinical Staff  Subject: Non-Urgent Medical Question     Hello,    I received a call back from Fetal Maternal Med

## 2019-11-13 ENCOUNTER — TELEPHONE (OUTPATIENT)
Dept: INTERNAL MEDICINE CLINIC | Facility: CLINIC | Age: 44
End: 2019-11-13

## 2019-11-13 ENCOUNTER — OFFICE VISIT (OUTPATIENT)
Dept: PHYSICAL THERAPY | Age: 44
End: 2019-11-13
Attending: INTERNAL MEDICINE
Payer: COMMERCIAL

## 2019-11-13 DIAGNOSIS — M25.511 CHRONIC PAIN OF BOTH SHOULDERS: ICD-10-CM

## 2019-11-13 DIAGNOSIS — M25.512 CHRONIC PAIN OF BOTH SHOULDERS: ICD-10-CM

## 2019-11-13 DIAGNOSIS — G89.29 CHRONIC PAIN OF BOTH SHOULDERS: ICD-10-CM

## 2019-11-13 PROCEDURE — 97140 MANUAL THERAPY 1/> REGIONS: CPT | Performed by: PHYSICAL THERAPIST

## 2019-11-13 PROCEDURE — 97110 THERAPEUTIC EXERCISES: CPT | Performed by: PHYSICAL THERAPIST

## 2019-11-13 PROCEDURE — 97035 APP MDLTY 1+ULTRASOUND EA 15: CPT | Performed by: PHYSICAL THERAPIST

## 2019-11-13 NOTE — TELEPHONE ENCOUNTER
Pt stopped in today and dropped off a health screening form to be filled out by Dr Geo Alaniz. Form placed in [employer]. Pt did not sign the form, pt will have to be called to come back in and sign before faxing.      Call pt @ [phone]

## 2019-11-13 NOTE — PROGRESS NOTES
Dx:      Diagnosis: Chronic pain of both shoulders (M25.511,G89.29,M25.512)     Insurance (Authorized # of Visits):  4/8  Authorizing Physician: Dr. Amadeo Shah  Next MD visit: none scheduled  Fall Risk: standard         Precautions: n/a           Subjective:  T below.  Isometric L/R shoulder ER x 10 reps each    There ex:   HEP review; see below.   B low scapular retraction/depression c yellow band x 3 reps  - c/o, stopped  R shoulder ER c YTB x10 reps each, L - c/o pain   Pt education/review re: bodymechanics for

## 2019-11-14 ENCOUNTER — OFFICE VISIT (OUTPATIENT)
Dept: OBGYN CLINIC | Facility: CLINIC | Age: 44
End: 2019-11-14
Payer: COMMERCIAL

## 2019-11-14 VITALS
DIASTOLIC BLOOD PRESSURE: 72 MMHG | BODY MASS INDEX: 24.29 KG/M2 | WEIGHT: 132 LBS | HEIGHT: 62 IN | SYSTOLIC BLOOD PRESSURE: 126 MMHG

## 2019-11-14 DIAGNOSIS — Z01.419 WOMEN'S ANNUAL ROUTINE GYNECOLOGICAL EXAMINATION: Primary | ICD-10-CM

## 2019-11-14 PROBLEM — R10.9 ABDOMINAL WALL PAIN: Status: RESOLVED | Noted: 2019-04-02 | Resolved: 2019-11-14

## 2019-11-14 PROCEDURE — 99396 PREV VISIT EST AGE 40-64: CPT | Performed by: OBSTETRICS & GYNECOLOGY

## 2019-11-14 NOTE — PROGRESS NOTES
HPI:    Patient ID: Milly Shaffer is a 38year old year old female. HPI  Well woman visit  Last menstrual period November 5. In the process of fertility clinic evaluations.   Will be having a preconceptional counseling with maternal-fetal medicine  Non-medical: Not on file    Tobacco Use      Smoking status: Never Smoker      Smokeless tobacco: Never Used    Substance and Sexual Activity      Alcohol use:  Yes        Alcohol/week: 0.0 standard drinks        Comment: OCC      Drug use: No      Sexual a bowel sounds are normal. She exhibits no mass. There is no hepatosplenomegaly. There is no tenderness. There is no rebound and no CVA tenderness. No hernia.  Hernia negative in the ventral area,  negative in the right inguinal area and negative in the left clinic      No orders of the defined types were placed in this encounter.       Cholecalciferol (VITAMIN D) 2000 units Oral Cap, Take by mouth., Disp: , Rfl:   Xqsvxq-KaQmy-OuOwh-Meth-FA-DHA (PRENATE MINI) 18-0.6-0.4-350 MG Oral Cap, , Disp: , Rfl:     No f

## 2019-11-14 NOTE — TELEPHONE ENCOUNTER
Form completed by Dr. Summer Lares. I spoke with patient to let her know that the form is ready at the . She will need to come in to sign and date the form. Form at .

## 2019-11-15 ENCOUNTER — PATIENT MESSAGE (OUTPATIENT)
Dept: INTERNAL MEDICINE CLINIC | Facility: CLINIC | Age: 44
End: 2019-11-15

## 2019-11-15 NOTE — TELEPHONE ENCOUNTER
Called patient and relayed DR. RAMÍREZ message - verbalized understanding . Transferred to [employer] to schedule ronal with DR. RAMÍREZ

## 2019-11-15 NOTE — TELEPHONE ENCOUNTER
From: Martha Mensah  To: Wanda Terrell MD  Sent: 11/15/2019 10:06 AM CST  Subject: Non-Urgent Medical Question    Hello,   My physical therapist mentioned for me to follow up with Dr. Gipson Files about my bilateral shoulder issues.  Unfortunately, nothing h

## 2019-11-20 ENCOUNTER — OFFICE VISIT (OUTPATIENT)
Dept: INTERNAL MEDICINE CLINIC | Facility: CLINIC | Age: 44
End: 2019-11-20
Payer: COMMERCIAL

## 2019-11-20 VITALS
HEART RATE: 70 BPM | DIASTOLIC BLOOD PRESSURE: 80 MMHG | WEIGHT: 132 LBS | BODY MASS INDEX: 24.29 KG/M2 | OXYGEN SATURATION: 99 % | SYSTOLIC BLOOD PRESSURE: 114 MMHG | HEIGHT: 62 IN

## 2019-11-20 DIAGNOSIS — M25.512 BILATERAL SHOULDER PAIN, UNSPECIFIED CHRONICITY: Primary | ICD-10-CM

## 2019-11-20 DIAGNOSIS — M25.511 BILATERAL SHOULDER PAIN, UNSPECIFIED CHRONICITY: Primary | ICD-10-CM

## 2019-11-20 PROCEDURE — 99212 OFFICE O/P EST SF 10 MIN: CPT | Performed by: INTERNAL MEDICINE

## 2019-11-20 NOTE — PROGRESS NOTES
HPI:    Patient ID: Hal Armstrong is a 38year old female. Patient is noting continued bilateral shoulder and upper arm pain. She has attended 5 physical therapy sessions but has not noted any improvement.   She notes difficulty raising arms and naranjo noted improvement with physical therapy. I am unsure whether this is coming from her shoulder joint itself, biceps tendon area or possibly radicular-like from her neck. Therefore will have patient see orthopedic surgeon for further evaluation.   Since she

## 2019-12-07 NOTE — PROGRESS NOTES
Outpatient Maternal-Fetal Medicine Consultation    Dear Dr. Phil Tatum,    Thank you for requesting maternal fetal medicine consultation on your patient Jing Prater. As you are aware she is a 38year old female who desires pregnancy.   A maternal-fetal med Rfl:   •  Xabqhz-SgSvz-GiIcd-Meth-FA-DHA (PRENATE MINI) 18-0.6-0.4-350 MG Oral Cap, , Disp: , Rfl:   Allergies: No Known Allergies      PHYSICAL EXAMINATION:  BP (!) 135/94   Wt 133 lb (60.3 kg)   BMI 24.33 kg/m²   General: alert and oriented,no acute dist or older are more likely to be delivered by .  The  delivery rate in the general obstetric population of the [facility] is almost 30 percent, compared to almost 48 percent in women over age 36 to 39 and almost [de-identified] percent in women age 48 t 1: 25.    Invasive Testing  I offered invasive genetic testing (amniocentesis, chorionic villus sampling) after reviewing the diagnostic accuracy of these tests as well as the procedure associated loss rate (1:500 for genetic amniocentesis).     She ultimat increased. Myomectomy:     We discussed the risks of pregnancy with history of myomectomies including but not limited to uterine rupture, fetal demise, maternal hemorrhage and placenta accreta.    While the association of uterine rupture during labor i check her blood pressure 2-3 times per week and keep a log. In addition she should bring her home blood pressure cuff into her next visit with Dr. Eliu Schmitz to compared it to their readings. We discussed that her age is a risk factor for preeclampsia.   I wo

## 2019-12-13 ENCOUNTER — HOSPITAL ENCOUNTER (OUTPATIENT)
Dept: PERINATAL CARE | Facility: HOSPITAL | Age: 44
Discharge: HOME | End: 2019-12-13
Attending: OBSTETRICS & GYNECOLOGY
Payer: COMMERCIAL

## 2019-12-13 VITALS — WEIGHT: 133 LBS | DIASTOLIC BLOOD PRESSURE: 94 MMHG | BODY MASS INDEX: 24 KG/M2 | SYSTOLIC BLOOD PRESSURE: 135 MMHG

## 2019-12-13 DIAGNOSIS — Z98.890 H/O MYOMECTOMY: ICD-10-CM

## 2019-12-13 DIAGNOSIS — Z31.69 PRE-CONCEPTION COUNSELING: ICD-10-CM

## 2019-12-13 PROCEDURE — 99243 OFF/OP CNSLTJ NEW/EST LOW 30: CPT | Performed by: OBSTETRICS & GYNECOLOGY

## 2020-03-11 ENCOUNTER — PATIENT MESSAGE (OUTPATIENT)
Dept: INTERNAL MEDICINE CLINIC | Facility: CLINIC | Age: 45
End: 2020-03-11

## 2020-03-11 NOTE — TELEPHONE ENCOUNTER
From: Shashank Hamilton  To: Petrona Kowalski MD  Sent: 3/11/2020 7:44 AM CDT  Subject: Visit Follow-up Question    Good Morning,    I was hoping I can  a copy of my physical from my last visit.  My fertility specialist request this for my next appoin

## 2020-03-12 ENCOUNTER — PATIENT MESSAGE (OUTPATIENT)
Dept: INTERNAL MEDICINE CLINIC | Facility: CLINIC | Age: 45
End: 2020-03-12

## 2020-03-12 NOTE — TELEPHONE ENCOUNTER
From: Rita Almaraz  To: Zaina Syed MD  Sent: 3/12/2020 2:15 PM CDT  Subject: Test Results Question    Hello,  I was seen by Dr. Calvin Malik for my health assessment for work. EKG was done and exam for lungs and breast were also examined.  Is there a way

## 2020-03-13 ENCOUNTER — PATIENT MESSAGE (OUTPATIENT)
Dept: INTERNAL MEDICINE CLINIC | Facility: CLINIC | Age: 45
End: 2020-03-13

## 2020-03-13 NOTE — TELEPHONE ENCOUNTER
From: Chris Maguire  To: Xi Burton MD  Sent: 3/13/2020 8:13 AM CDT  Subject: Test Results Question    Jalen Melgar,    I appreciate your help.  The fax number to the [facility] center for Dr. Fabiola Lyons is [phone]    Thanks again,  Lucia Ledezma

## 2020-03-24 ENCOUNTER — PATIENT MESSAGE (OUTPATIENT)
Dept: INTERNAL MEDICINE CLINIC | Facility: CLINIC | Age: 45
End: 2020-03-24

## 2020-03-24 NOTE — TELEPHONE ENCOUNTER
From: Henrietta Escoto  To: Delanna Holter, MD  Sent: 3/24/2020 3:43 PM CDT  Subject: Visit Follow-up Question    Hello,    Thank you I did  the form. Dr. Pizarro Abts office the Fertility MD. Request a clearance form as well for my EKG.  They mentione

## 2020-03-24 NOTE — TELEPHONE ENCOUNTER
Clearance form from [address] faxed back to [phone], confirmation rec'd. Original sent to scanning. Copy in weekly hold bin.

## 2020-04-30 ENCOUNTER — E-VISIT (OUTPATIENT)
Dept: FAMILY MEDICINE CLINIC | Facility: CLINIC | Age: 45
End: 2020-04-30

## 2020-04-30 DIAGNOSIS — B34.9 ACUTE VIRAL SYNDROME: Primary | ICD-10-CM

## 2020-04-30 PROCEDURE — 99421 OL DIG E/M SVC 5-10 MIN: CPT | Performed by: PHYSICIAN ASSISTANT

## 2020-05-01 NOTE — PROGRESS NOTES
Coty Sofia is a 38year old female. HPI:   See answers to questions above. Current Outpatient Medications   Medication Sig Dispense Refill   • Cholecalciferol (VITAMIN D) 2000 units Oral Cap Take by mouth.      • Fifdge-FmIyg-CvHdf-Meth-FA-DHA (P

## 2020-07-02 DIAGNOSIS — Z78.9 PARTICIPANT IN HEALTH AND WELLNESS PLAN: Primary | ICD-10-CM

## 2020-08-10 ENCOUNTER — PATIENT MESSAGE (OUTPATIENT)
Dept: INTERNAL MEDICINE CLINIC | Facility: CLINIC | Age: 45
End: 2020-08-10

## 2020-08-10 DIAGNOSIS — Z12.31 ENCOUNTER FOR SCREENING MAMMOGRAM FOR MALIGNANT NEOPLASM OF BREAST: Primary | ICD-10-CM

## 2020-08-10 NOTE — TELEPHONE ENCOUNTER
From: Ken Reece  To: Suha Simpson MD  Sent: 8/10/2020 2:41 PM CDT  Subject: Referral Request    Hello,    I received a message about my yearly evaluation for my mammo. Can you please put in for a referral so I can schedule it.  I will greatly ronal

## 2020-08-16 ENCOUNTER — NURSE ONLY (OUTPATIENT)
Dept: LAB | Age: 45
End: 2020-08-16
Attending: PREVENTIVE MEDICINE

## 2020-08-20 ENCOUNTER — HOSPITAL ENCOUNTER (OUTPATIENT)
Dept: MAMMOGRAPHY | Facility: HOSPITAL | Age: 45
Discharge: HOME | End: 2020-08-20
Attending: INTERNAL MEDICINE
Payer: COMMERCIAL

## 2020-08-20 DIAGNOSIS — Z12.31 ENCOUNTER FOR SCREENING MAMMOGRAM FOR MALIGNANT NEOPLASM OF BREAST: ICD-10-CM

## 2020-08-20 PROCEDURE — 77062 BREAST TOMOSYNTHESIS BI: CPT | Performed by: INTERNAL MEDICINE

## 2020-08-20 PROCEDURE — 77066 DX MAMMO INCL CAD BI: CPT | Performed by: INTERNAL MEDICINE

## 2020-08-31 ENCOUNTER — APPOINTMENT (OUTPATIENT)
Dept: ULTRASOUND IMAGING | Facility: HOSPITAL | Age: 45
End: 2020-08-31
Attending: EMERGENCY MEDICINE
Payer: COMMERCIAL

## 2020-08-31 ENCOUNTER — APPOINTMENT (OUTPATIENT)
Dept: GENERAL RADIOLOGY | Facility: HOSPITAL | Age: 45
End: 2020-08-31
Attending: EMERGENCY MEDICINE
Payer: COMMERCIAL

## 2020-08-31 ENCOUNTER — E-VISIT (OUTPATIENT)
Dept: TELEHEALTH | Age: 45
End: 2020-08-31

## 2020-08-31 ENCOUNTER — HOSPITAL ENCOUNTER (EMERGENCY)
Facility: HOSPITAL | Age: 45
Discharge: HOME | End: 2020-08-31
Attending: EMERGENCY MEDICINE
Payer: COMMERCIAL

## 2020-08-31 VITALS
BODY MASS INDEX: 24.84 KG/M2 | WEIGHT: 135 LBS | OXYGEN SATURATION: 97 % | HEIGHT: 62 IN | DIASTOLIC BLOOD PRESSURE: 97 MMHG | SYSTOLIC BLOOD PRESSURE: 149 MMHG | TEMPERATURE: 99 F | HEART RATE: 68 BPM | RESPIRATION RATE: 24 BRPM

## 2020-08-31 DIAGNOSIS — Z02.9 ENCOUNTERS FOR ADMINISTRATIVE PURPOSE: Primary | ICD-10-CM

## 2020-08-31 DIAGNOSIS — R07.9 CHEST PAIN, UNSPECIFIED TYPE: Primary | ICD-10-CM

## 2020-08-31 LAB
ALBUMIN SERPL-MCNC: 3.8 G/DL (ref 3.4–5)
ALP LIVER SERPL-CCNC: 41 U/L (ref 37–98)
ALT SERPL-CCNC: 17 U/L (ref 13–56)
ANION GAP SERPL CALC-SCNC: 8 MMOL/L (ref 0–18)
AST SERPL-CCNC: 16 U/L (ref 15–37)
B-HCG UR QL: NEGATIVE
BASOPHILS # BLD AUTO: 0.05 X10(3) UL (ref 0–0.2)
BASOPHILS NFR BLD AUTO: 0.7 %
BILIRUB DIRECT SERPL-MCNC: 0.1 MG/DL (ref 0–0.2)
BILIRUB SERPL-MCNC: 0.4 MG/DL (ref 0.1–2)
BUN BLD-MCNC: 11 MG/DL (ref 7–18)
BUN/CREAT SERPL: 11.8 (ref 10–20)
CALCIUM BLD-MCNC: 9 MG/DL (ref 8.5–10.1)
CHLORIDE SERPL-SCNC: 106 MMOL/L (ref 98–112)
CO2 SERPL-SCNC: 26 MMOL/L (ref 21–32)
CREAT BLD-MCNC: 0.93 MG/DL (ref 0.55–1.02)
D DIMER PPP FEU-MCNC: <0.27 UG/ML FEU (ref ?–0.5)
DEPRECATED RDW RBC AUTO: 39.1 FL (ref 35.1–46.3)
EOSINOPHIL # BLD AUTO: 0.09 X10(3) UL (ref 0–0.7)
EOSINOPHIL NFR BLD AUTO: 1.2 %
ERYTHROCYTE [DISTWIDTH] IN BLOOD BY AUTOMATED COUNT: 12 % (ref 11–15)
GLUCOSE BLD-MCNC: 87 MG/DL (ref 70–99)
HCT VFR BLD AUTO: 38.5 % (ref 35–48)
HGB BLD-MCNC: 13.8 G/DL (ref 12–16)
IMM GRANULOCYTES # BLD AUTO: 0.02 X10(3) UL (ref 0–1)
IMM GRANULOCYTES NFR BLD: 0.3 %
LIPASE SERPL-CCNC: 131 U/L (ref 73–393)
LYMPHOCYTES # BLD AUTO: 2.03 X10(3) UL (ref 1–4)
LYMPHOCYTES NFR BLD AUTO: 28.1 %
M PROTEIN MFR SERPL ELPH: 7.7 G/DL (ref 6.4–8.2)
MCH RBC QN AUTO: 31.9 PG (ref 26–34)
MCHC RBC AUTO-ENTMCNC: 35.8 G/DL (ref 31–37)
MCV RBC AUTO: 89.1 FL (ref 80–100)
MONOCYTES # BLD AUTO: 0.4 X10(3) UL (ref 0.1–1)
MONOCYTES NFR BLD AUTO: 5.5 %
NEUTROPHILS # BLD AUTO: 4.64 X10 (3) UL (ref 1.5–7.7)
NEUTROPHILS # BLD AUTO: 4.64 X10(3) UL (ref 1.5–7.7)
NEUTROPHILS NFR BLD AUTO: 64.2 %
OSMOLALITY SERPL CALC.SUM OF ELEC: 289 MOSM/KG (ref 275–295)
PLATELET # BLD AUTO: 313 10(3)UL (ref 150–450)
POTASSIUM SERPL-SCNC: 3.6 MMOL/L (ref 3.5–5.1)
RBC # BLD AUTO: 4.32 X10(6)UL (ref 3.8–5.3)
SODIUM SERPL-SCNC: 140 MMOL/L (ref 136–145)
TROPONIN I SERPL-MCNC: <0.045 NG/ML (ref ?–0.04)
WBC # BLD AUTO: 7.2 X10(3) UL (ref 4–11)

## 2020-08-31 PROCEDURE — 80076 HEPATIC FUNCTION PANEL: CPT | Performed by: EMERGENCY MEDICINE

## 2020-08-31 PROCEDURE — 80048 BASIC METABOLIC PNL TOTAL CA: CPT | Performed by: EMERGENCY MEDICINE

## 2020-08-31 PROCEDURE — 81025 URINE PREGNANCY TEST: CPT

## 2020-08-31 PROCEDURE — 76705 ECHO EXAM OF ABDOMEN: CPT | Performed by: EMERGENCY MEDICINE

## 2020-08-31 PROCEDURE — 83690 ASSAY OF LIPASE: CPT | Performed by: EMERGENCY MEDICINE

## 2020-08-31 PROCEDURE — 99285 EMERGENCY DEPT VISIT HI MDM: CPT

## 2020-08-31 PROCEDURE — 85379 FIBRIN DEGRADATION QUANT: CPT | Performed by: EMERGENCY MEDICINE

## 2020-08-31 PROCEDURE — 84484 ASSAY OF TROPONIN QUANT: CPT | Performed by: EMERGENCY MEDICINE

## 2020-08-31 PROCEDURE — 93005 ELECTROCARDIOGRAM TRACING: CPT

## 2020-08-31 PROCEDURE — 93010 ELECTROCARDIOGRAM REPORT: CPT | Performed by: EMERGENCY MEDICINE

## 2020-08-31 PROCEDURE — 71045 X-RAY EXAM CHEST 1 VIEW: CPT | Performed by: EMERGENCY MEDICINE

## 2020-08-31 PROCEDURE — 85025 COMPLETE CBC W/AUTO DIFF WBC: CPT | Performed by: EMERGENCY MEDICINE

## 2020-08-31 PROCEDURE — 36415 COLL VENOUS BLD VENIPUNCTURE: CPT

## 2020-08-31 NOTE — PROGRESS NOTES
Spoke to patient over the phone. 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 Advised patient to be seen [facility] ER today and without delay.  Notif

## 2020-08-31 NOTE — ED INITIAL ASSESSMENT (HPI)
Pt reports chest pressure x 1wk, and intermittent R arm pain. Stomach pain when eating. Denies n/v, reports mild changes to stool. +HA. Pt sts works in [address] [employer] she thought pain was initially strain from moving patients.  Pt sts s/s have been gradually w

## 2020-09-01 NOTE — ED NOTES
Patient presents with 1 week of chest pressure. Patient notes sometimes the pain radiates to right arm. Denies SOB at this time. Placed on cardiac monitor and pulse ox.

## 2020-09-01 NOTE — ED PROVIDER NOTES
Patient Seen in: [facility] Department      History   Patient presents with:  Chest Pain Angina    Stated Complaint: TL - chest pressure, arm heaviness, SOB     HPI    48-year-old female with history of uterine fibroids status post myome Pulse 88   Resp 18   Temp 98.9 °F (37.2 °C)   Temp src Oral   SpO2 99 %   O2 Device None (Room air)       Current:BP (!) 116/101   Pulse 64   Temp 98.9 °F (37.2 °C) (Oral)   Resp 24   Ht 157.5 cm (5' 2\")   Wt 61.2 kg   LMP 08/27/2020   SpO2 95%   BMI 24 MDM     Lab, x-ray, EKG, and ultrasound results noted. No cause of the patient's pain found at this time. Resting comfortably.   Will discharge the patient home with plans to follow-up with her primary physician later this week for reevaluati

## 2020-09-29 ENCOUNTER — LAB ENCOUNTER (OUTPATIENT)
Dept: LAB | Age: 45
End: 2020-09-29
Attending: INTERNAL MEDICINE
Payer: COMMERCIAL

## 2020-09-29 ENCOUNTER — OFFICE VISIT (OUTPATIENT)
Dept: INTERNAL MEDICINE CLINIC | Facility: CLINIC | Age: 45
End: 2020-09-29
Payer: COMMERCIAL

## 2020-09-29 VITALS
HEART RATE: 98 BPM | RESPIRATION RATE: 14 BRPM | OXYGEN SATURATION: 99 % | WEIGHT: 135 LBS | DIASTOLIC BLOOD PRESSURE: 84 MMHG | BODY MASS INDEX: 25 KG/M2 | SYSTOLIC BLOOD PRESSURE: 128 MMHG | TEMPERATURE: 97 F

## 2020-09-29 DIAGNOSIS — G89.29 CHRONIC RIGHT SHOULDER PAIN: ICD-10-CM

## 2020-09-29 DIAGNOSIS — R10.2 PELVIC PAIN: Primary | ICD-10-CM

## 2020-09-29 DIAGNOSIS — M25.511 CHRONIC RIGHT SHOULDER PAIN: ICD-10-CM

## 2020-09-29 DIAGNOSIS — R10.2 PELVIC PAIN: ICD-10-CM

## 2020-09-29 LAB
BACTERIA UR QL AUTO: NEGATIVE /HPF
BILIRUB UR QL: NEGATIVE
COLOR UR: YELLOW
GLUCOSE UR-MCNC: NEGATIVE MG/DL
KETONES UR-MCNC: NEGATIVE MG/DL
LEUKOCYTE ESTERASE UR QL STRIP.AUTO: NEGATIVE
NITRITE UR QL STRIP.AUTO: NEGATIVE
PH UR: 6 [PH] (ref 5–8)
PROT UR-MCNC: NEGATIVE MG/DL
RBC #/AREA URNS AUTO: 1 /HPF
SP GR UR STRIP: 1.02 (ref 1–1.03)
UROBILINOGEN UR STRIP-ACNC: <2
WBC #/AREA URNS AUTO: 1 /HPF

## 2020-09-29 PROCEDURE — 83690 ASSAY OF LIPASE: CPT

## 2020-09-29 PROCEDURE — 36415 COLL VENOUS BLD VENIPUNCTURE: CPT

## 2020-09-29 PROCEDURE — 80053 COMPREHEN METABOLIC PANEL: CPT

## 2020-09-29 PROCEDURE — 81001 URINALYSIS AUTO W/SCOPE: CPT | Performed by: INTERNAL MEDICINE

## 2020-09-29 PROCEDURE — 85025 COMPLETE CBC W/AUTO DIFF WBC: CPT

## 2020-09-29 PROCEDURE — 99213 OFFICE O/P EST LOW 20 MIN: CPT | Performed by: INTERNAL MEDICINE

## 2020-09-29 PROCEDURE — 3079F DIAST BP 80-89 MM HG: CPT | Performed by: INTERNAL MEDICINE

## 2020-09-29 PROCEDURE — 3074F SYST BP LT 130 MM HG: CPT | Performed by: INTERNAL MEDICINE

## 2020-09-29 RX ORDER — IBUPROFEN 600 MG/1
600 TABLET ORAL EVERY 6 HOURS PRN
Qty: 30 TABLET | Refills: 0 | Status: SHIPPED | OUTPATIENT
Start: 2020-09-29 | End: 2021-11-16

## 2020-09-29 NOTE — PROGRESS NOTES
HPI:    Patient ID: Deana Gayle is a 38year old female. Notes lower abd pain near umbilicus x 1wk. Sometimes associated with mid low back pain. Denies any pain or burning with urination. Denies any change in bowel habits. No blood in stools. No murmur heard. Pulmonary/Chest: Effort normal and breath sounds normal. No respiratory distress. She has no wheezes. She has no rales. Abdominal: Soft. She exhibits no distension and no mass.    There is some tenderness to palpation over pelvic area mg total) by mouth every 6 (six) hours as needed for Pain.        Imaging & Referrals:  ORTHOPEDIC - INTERNAL  US PELVIS (TRANSVAGINAL PELVIS) (CPT=76830)       VR#[id number]

## 2020-10-16 ENCOUNTER — HOSPITAL ENCOUNTER (OUTPATIENT)
Dept: ULTRASOUND IMAGING | Age: 45
Discharge: HOME | End: 2020-10-16
Attending: INTERNAL MEDICINE
Payer: COMMERCIAL

## 2020-10-16 DIAGNOSIS — R10.2 PELVIC PAIN: ICD-10-CM

## 2020-10-16 PROCEDURE — 76856 US EXAM PELVIC COMPLETE: CPT | Performed by: INTERNAL MEDICINE

## 2020-10-16 PROCEDURE — 76830 TRANSVAGINAL US NON-OB: CPT | Performed by: INTERNAL MEDICINE

## 2020-10-27 ENCOUNTER — TELEPHONE (OUTPATIENT)
Dept: INTERNAL MEDICINE CLINIC | Facility: HOSPITAL | Age: 45
End: 2020-10-27

## 2020-10-27 DIAGNOSIS — Z20.822 SUSPECTED 2019 NOVEL CORONAVIRUS INFECTION: Primary | ICD-10-CM

## 2020-10-28 ENCOUNTER — LAB ENCOUNTER (OUTPATIENT)
Dept: LAB | Age: 45
End: 2020-10-28
Attending: PREVENTIVE MEDICINE

## 2020-10-28 DIAGNOSIS — Z20.822 SUSPECTED 2019 NOVEL CORONAVIRUS INFECTION: ICD-10-CM

## 2020-11-09 ENCOUNTER — TELEPHONE (OUTPATIENT)
Dept: INTERNAL MEDICINE CLINIC | Facility: CLINIC | Age: 45
End: 2020-11-09

## 2020-11-09 NOTE — TELEPHONE ENCOUNTER
Spoke with patient who reports she is still having a cough, when she lays down and then sits up it feels phlegmy but otherwise is dry. She states talking really makes her cough, she has multiple coughing episodes while on the phone with me.  She has been ta

## 2020-11-09 NOTE — TELEPHONE ENCOUNTER
Patient tested positive for Covid 19 on Oct 28  - was tested thru [facility]    Cough has not gone away  Feels it is the same not better or worse  Talking brings cough on   - over the counter dayquil/nyquil not helping  - Still having night sweats

## 2020-11-09 NOTE — TELEPHONE ENCOUNTER
Patient cannot go back to work until she is symptom-free for at least 48 hours. She should continue to push fluids. She can continue to use Tylenol as needed to help with fevers and generalized aches.   Would recommend over-the-counter Robitussin DM 5 cc

## 2020-11-19 ENCOUNTER — PATIENT MESSAGE (OUTPATIENT)
Dept: INTERNAL MEDICINE CLINIC | Facility: CLINIC | Age: 45
End: 2020-11-19

## 2020-11-19 NOTE — TELEPHONE ENCOUNTER
From: Rogers Colon  To: Antionette Newby MD  Sent: 11/19/2020 9:56 AM CST  Subject: Other    Hello,    I was wondering if I can have the \"Know your Numbers\" form filled out for my insurance provider. I will greatly appreciate it.     Thanks,  Raul Addison

## 2020-11-25 ENCOUNTER — TELEPHONE (OUTPATIENT)
Dept: INTERNAL MEDICINE CLINIC | Facility: CLINIC | Age: 45
End: 2020-11-25

## 2020-11-25 NOTE — TELEPHONE ENCOUNTER
Patient dropped off a health provider screening form to be filled out. Please notify patient in my chart when complete  Form placed in Dr Daria Bishop mail slot.

## 2020-11-25 NOTE — TELEPHONE ENCOUNTER
UPON /PLEASE ASK FOR WAIST MEASUREMENT AND SIGNATURE. ALSO MAKE COPY FOR FAX AND PTS EMR. MYCHART TO PT SENT:    Harpal Pires,   Your '[address]' is filled out and signed by Dr. Richard Vee;  Our office will re-open Friday and you

## 2020-12-02 ENCOUNTER — OFFICE VISIT (OUTPATIENT)
Dept: OBGYN CLINIC | Facility: CLINIC | Age: 45
End: 2020-12-02
Payer: COMMERCIAL

## 2020-12-02 VITALS — WEIGHT: 140.81 LBS | DIASTOLIC BLOOD PRESSURE: 70 MMHG | SYSTOLIC BLOOD PRESSURE: 130 MMHG | BODY MASS INDEX: 26 KG/M2

## 2020-12-02 DIAGNOSIS — Z01.419 ENCOUNTER FOR WELL WOMAN EXAM WITH ROUTINE GYNECOLOGICAL EXAM: Primary | ICD-10-CM

## 2020-12-02 PROCEDURE — 3078F DIAST BP <80 MM HG: CPT | Performed by: OBSTETRICS & GYNECOLOGY

## 2020-12-02 PROCEDURE — 99396 PREV VISIT EST AGE 40-64: CPT | Performed by: OBSTETRICS & GYNECOLOGY

## 2020-12-02 PROCEDURE — 3075F SYST BP GE 130 - 139MM HG: CPT | Performed by: OBSTETRICS & GYNECOLOGY

## 2020-12-02 NOTE — PROGRESS NOTES
HPI:    Patient ID: Rita Almaraz is a 54year old year old female. HPI  Well woman visit  No complaints. Menstrual cycles are regular and normal not too heavy or painful. History of multiple abdominal myomectomies.   Stopped her reproductive endocr mobility or evident urinary incontinence. Cervix- smooth, normal epithelium without lesions or discharge. No motion tenderness. Uterus- normal size, shape, and contour. Nontender. No masses. Adnexa-  Nontender, no masses.    Perineum- normal without

## 2021-04-09 DIAGNOSIS — M79.673 PAIN OF FOOT, UNSPECIFIED LATERALITY: ICD-10-CM

## 2021-04-09 DIAGNOSIS — R06.00 DYSPNEA, UNSPECIFIED TYPE: Primary | ICD-10-CM

## 2021-04-10 NOTE — TELEPHONE ENCOUNTER
To Dr. Leelee Cazares to advise on MRI. Patient last seen 10/9/19.  Should patient come in for re-evaluation visit first? Yes

## 2021-04-13 ENCOUNTER — HOSPITAL ENCOUNTER (OUTPATIENT)
Dept: GENERAL RADIOLOGY | Age: 46
Discharge: HOME | End: 2021-04-13
Attending: INTERNAL MEDICINE
Payer: COMMERCIAL

## 2021-04-13 DIAGNOSIS — R06.00 DYSPNEA, UNSPECIFIED TYPE: ICD-10-CM

## 2021-04-13 PROCEDURE — 71046 X-RAY EXAM CHEST 2 VIEWS: CPT | Performed by: INTERNAL MEDICINE

## 2021-04-27 ENCOUNTER — OFFICE VISIT (OUTPATIENT)
Dept: PODIATRY CLINIC | Facility: CLINIC | Age: 46
End: 2021-04-27
Payer: COMMERCIAL

## 2021-04-27 DIAGNOSIS — M20.42 HAMMERTOE OF LEFT FOOT: ICD-10-CM

## 2021-04-27 DIAGNOSIS — M21.612 BUNION, LEFT FOOT: ICD-10-CM

## 2021-04-27 DIAGNOSIS — M79.675 TOE PAIN, CHRONIC, LEFT: ICD-10-CM

## 2021-04-27 DIAGNOSIS — B35.1 ONYCHOMYCOSIS: Primary | ICD-10-CM

## 2021-04-27 DIAGNOSIS — G89.29 TOE PAIN, CHRONIC, LEFT: ICD-10-CM

## 2021-04-27 DIAGNOSIS — L60.0 INGROWN TOENAIL: ICD-10-CM

## 2021-04-27 PROCEDURE — 99203 OFFICE O/P NEW LOW 30 MIN: CPT | Performed by: PODIATRIST

## 2021-04-27 RX ORDER — TERBINAFINE HYDROCHLORIDE 250 MG/1
250 TABLET ORAL DAILY
Qty: 90 TABLET | Refills: 0 | Status: SHIPPED | OUTPATIENT
Start: 2021-04-27 | End: 2021-07-26

## 2021-04-27 NOTE — PROGRESS NOTES
[facility] Podiatry  Progress Note    March Venkata is a 54year old female. Patient presents with:   Toe Pain: Left 2nd toe nail pain and L 3rd toe curving into the 2nd toe - got bothersome for the past month - rates pain as 1-2/10 on and off [place]    Tobacco Use      Smoking status: Never Smoker      Smokeless tobacco: Never Used    Substance and Sexual Activity      Alcohol use:  Yes        Alcohol/week: 0.0 standard drinks        Comment: OCC      Drug use: No      Sexual activity: Yes orders  -     Terbinafine HCl 250 MG Oral Tab; Take 1 tablet (250 mg total) by mouth daily.         Plan:     Reviewed treatment options for nail dystrophy / onychomycosis including:   No treatment and monitoring, topical meds, oral meds, nail removal and l

## 2021-07-08 ENCOUNTER — APPOINTMENT (OUTPATIENT)
Dept: OTHER | Facility: HOSPITAL | Age: 46
End: 2021-07-08
Attending: EMERGENCY MEDICINE

## 2021-07-14 ENCOUNTER — APPOINTMENT (OUTPATIENT)
Dept: OTHER | Facility: HOSPITAL | Age: 46
End: 2021-07-14
Attending: EMERGENCY MEDICINE

## 2021-07-28 ENCOUNTER — PATIENT MESSAGE (OUTPATIENT)
Dept: INTERNAL MEDICINE CLINIC | Facility: CLINIC | Age: 46
End: 2021-07-28

## 2021-07-28 DIAGNOSIS — Z12.31 ENCOUNTER FOR SCREENING MAMMOGRAM FOR MALIGNANT NEOPLASM OF BREAST: Primary | ICD-10-CM

## 2021-07-29 NOTE — TELEPHONE ENCOUNTER
From: Shashank Hamilton  To: Petrona Kowalski MD  Sent: 7/28/2021 11:06 PM CDT  Subject: Referral Request    Hello,    I received an email about scheduling my mammo. I was hoping if you can give me a referral so I will be able to schedule an appointment.  I

## 2021-08-16 ENCOUNTER — PATIENT MESSAGE (OUTPATIENT)
Dept: INTERNAL MEDICINE CLINIC | Facility: CLINIC | Age: 46
End: 2021-08-16

## 2021-08-16 NOTE — TELEPHONE ENCOUNTER
From: Shashank Hamilton  To: Petrona Kowalski MD  Sent: 8/16/2021 1:34 PM CDT  Subject: Non-Urgent Medical Question    Hello,    I received a notice about an over due colonoscopy.  Is there someone Dr. Tay Simmons can refer me to and do I need a referral so I can

## 2021-08-26 ENCOUNTER — OFFICE VISIT (OUTPATIENT)
Dept: OTHER | Facility: HOSPITAL | Age: 46
End: 2021-08-26
Attending: EMERGENCY MEDICINE

## 2021-08-26 DIAGNOSIS — Z77.21 PERSONAL HISTORY OF EXPOSURE TO POTENTIALLY HAZARDOUS BODY FLUIDS: Primary | ICD-10-CM

## 2021-08-26 LAB
HBV SURFACE AB SER QL: REACTIVE
HBV SURFACE AB SERPL IA-ACNC: >1000 MIU/ML
HCV AB SERPL QL IA: NONREACTIVE

## 2021-08-26 PROCEDURE — 86706 HEP B SURFACE ANTIBODY: CPT

## 2021-08-26 PROCEDURE — 87389 HIV-1 AG W/HIV-1&-2 AB AG IA: CPT

## 2021-08-26 PROCEDURE — 86803 HEPATITIS C AB TEST: CPT

## 2021-09-02 ENCOUNTER — HOSPITAL ENCOUNTER (OUTPATIENT)
Dept: MAMMOGRAPHY | Age: 46
Discharge: HOME | End: 2021-09-02
Attending: INTERNAL MEDICINE
Payer: COMMERCIAL

## 2021-09-02 DIAGNOSIS — Z12.31 ENCOUNTER FOR SCREENING MAMMOGRAM FOR MALIGNANT NEOPLASM OF BREAST: ICD-10-CM

## 2021-09-02 PROCEDURE — 77063 BREAST TOMOSYNTHESIS BI: CPT | Performed by: INTERNAL MEDICINE

## 2021-09-02 PROCEDURE — 77067 SCR MAMMO BI INCL CAD: CPT | Performed by: INTERNAL MEDICINE

## 2021-09-09 ENCOUNTER — HOSPITAL ENCOUNTER (OUTPATIENT)
Age: 46
Discharge: HOME | End: 2021-09-09
Payer: COMMERCIAL

## 2021-09-09 PROCEDURE — 0031A HC JANSSEN COVID-19 VACCINE ADMIN 1ST DOSE: CPT

## 2021-10-25 ENCOUNTER — PATIENT MESSAGE (OUTPATIENT)
Dept: INTERNAL MEDICINE CLINIC | Facility: CLINIC | Age: 46
End: 2021-10-25

## 2021-10-25 NOTE — TELEPHONE ENCOUNTER
Advised pt call office to set up an appt. Mychart sent.
From: Юлия Francis  To: Kalpana Ponce MD  Sent: 10/25/2021 3:10 PM CDT  Subject: Boil or bump    Hi,   I have a bump/boil on my bottom and was wondering if I can have a prescription for it.  I have had it for over 2months now and nothing I do has hel
Statement Selected

## 2021-11-16 ENCOUNTER — OFFICE VISIT (OUTPATIENT)
Dept: INTERNAL MEDICINE CLINIC | Facility: CLINIC | Age: 46
End: 2021-11-16
Payer: COMMERCIAL

## 2021-11-16 ENCOUNTER — LAB ENCOUNTER (OUTPATIENT)
Dept: LAB | Age: 46
End: 2021-11-16
Attending: INTERNAL MEDICINE
Payer: COMMERCIAL

## 2021-11-16 ENCOUNTER — PATIENT MESSAGE (OUTPATIENT)
Dept: ADMINISTRATIVE | Age: 46
End: 2021-11-16

## 2021-11-16 VITALS
BODY MASS INDEX: 25.58 KG/M2 | DIASTOLIC BLOOD PRESSURE: 64 MMHG | WEIGHT: 139 LBS | RESPIRATION RATE: 16 BRPM | SYSTOLIC BLOOD PRESSURE: 110 MMHG | HEART RATE: 77 BPM | OXYGEN SATURATION: 99 % | TEMPERATURE: 96 F | HEIGHT: 62 IN

## 2021-11-16 DIAGNOSIS — Z00.00 PHYSICAL EXAM: Primary | ICD-10-CM

## 2021-11-16 DIAGNOSIS — D22.9 ATYPICAL MOLE: ICD-10-CM

## 2021-11-16 DIAGNOSIS — N60.19 FIBROCYSTIC BREAST DISEASE (FCBD), UNSPECIFIED LATERALITY: ICD-10-CM

## 2021-11-16 DIAGNOSIS — Z00.00 PHYSICAL EXAM: ICD-10-CM

## 2021-11-16 PROCEDURE — 3074F SYST BP LT 130 MM HG: CPT | Performed by: INTERNAL MEDICINE

## 2021-11-16 PROCEDURE — 83036 HEMOGLOBIN GLYCOSYLATED A1C: CPT

## 2021-11-16 PROCEDURE — 84443 ASSAY THYROID STIM HORMONE: CPT

## 2021-11-16 PROCEDURE — 3078F DIAST BP <80 MM HG: CPT | Performed by: INTERNAL MEDICINE

## 2021-11-16 PROCEDURE — 36415 COLL VENOUS BLD VENIPUNCTURE: CPT

## 2021-11-16 PROCEDURE — 80061 LIPID PANEL: CPT

## 2021-11-16 PROCEDURE — 85025 COMPLETE CBC W/AUTO DIFF WBC: CPT

## 2021-11-16 PROCEDURE — 3008F BODY MASS INDEX DOCD: CPT | Performed by: INTERNAL MEDICINE

## 2021-11-16 PROCEDURE — 99396 PREV VISIT EST AGE 40-64: CPT | Performed by: INTERNAL MEDICINE

## 2021-11-16 PROCEDURE — 80053 COMPREHEN METABOLIC PANEL: CPT

## 2021-11-16 NOTE — PROGRESS NOTES
HPI:    Patient ID: Eber Regan is a 51year old female. Presents for physical.    In general has been feeling well. Notes enlarging facial mole and is concerned and would like to have it removed.     Appetite is stable and weight has been stable Negative for cold intolerance and heat intolerance. Genitourinary: Negative for decreased urine volume, dysuria and hematuria. Musculoskeletal: Negative for back pain and neck pain.    Allergic/Immunologic: Negative for environmental allergies and food leg: No edema. Left lower leg: No edema. Lymphadenopathy:      Cervical: No cervical adenopathy. Skin:     Comments: There is raised nodule left chin area   Neurological:      General: No focal deficit present.       Mental Status: She is alert and [id number]

## 2021-11-17 DIAGNOSIS — R73.03 PREDIABETES: Primary | ICD-10-CM

## 2021-11-17 DIAGNOSIS — E78.5 HYPERLIPIDEMIA, UNSPECIFIED HYPERLIPIDEMIA TYPE: ICD-10-CM

## 2021-11-17 NOTE — TELEPHONE ENCOUNTER
Left message to call back. Health Provider screening form faxed to [facility]  at [phone] and confirmation received.

## 2021-11-17 NOTE — TELEPHONE ENCOUNTER
----- Message from Shereen Saavedra MD sent at 11/17/2021  8:44 AM CST -----  Hemoglobin A1c shows very small elevation at 5.8--normal is less than 5.7. This therefore puts patient in prediabetes range.   Patient needs to be more aggressive with weight man

## 2021-12-02 ENCOUNTER — OFFICE VISIT (OUTPATIENT)
Dept: OBGYN CLINIC | Facility: CLINIC | Age: 46
End: 2021-12-02
Payer: COMMERCIAL

## 2021-12-02 ENCOUNTER — LAB ENCOUNTER (OUTPATIENT)
Dept: LAB | Facility: HOSPITAL | Age: 46
End: 2021-12-02
Attending: OBSTETRICS & GYNECOLOGY
Payer: COMMERCIAL

## 2021-12-02 VITALS
HEIGHT: 62 IN | DIASTOLIC BLOOD PRESSURE: 74 MMHG | SYSTOLIC BLOOD PRESSURE: 120 MMHG | BODY MASS INDEX: 25.21 KG/M2 | WEIGHT: 137 LBS

## 2021-12-02 DIAGNOSIS — Z11.3 SCREENING EXAMINATION FOR STD (SEXUALLY TRANSMITTED DISEASE): Primary | ICD-10-CM

## 2021-12-02 DIAGNOSIS — Z11.3 SCREENING EXAMINATION FOR STD (SEXUALLY TRANSMITTED DISEASE): ICD-10-CM

## 2021-12-02 DIAGNOSIS — Z01.419 WELL WOMAN EXAM WITH ROUTINE GYNECOLOGICAL EXAM: ICD-10-CM

## 2021-12-02 PROBLEM — D25.1 INTRAMURAL AND SUBSEROUS LEIOMYOMA OF UTERUS: Status: ACTIVE | Noted: 2021-12-02

## 2021-12-02 PROBLEM — D25.2 INTRAMURAL AND SUBSEROUS LEIOMYOMA OF UTERUS: Status: ACTIVE | Noted: 2021-12-02

## 2021-12-02 PROCEDURE — 3078F DIAST BP <80 MM HG: CPT | Performed by: OBSTETRICS & GYNECOLOGY

## 2021-12-02 PROCEDURE — 87389 HIV-1 AG W/HIV-1&-2 AB AG IA: CPT

## 2021-12-02 PROCEDURE — 3008F BODY MASS INDEX DOCD: CPT | Performed by: OBSTETRICS & GYNECOLOGY

## 2021-12-02 PROCEDURE — 36415 COLL VENOUS BLD VENIPUNCTURE: CPT

## 2021-12-02 PROCEDURE — 86780 TREPONEMA PALLIDUM: CPT

## 2021-12-02 PROCEDURE — 99396 PREV VISIT EST AGE 40-64: CPT | Performed by: OBSTETRICS & GYNECOLOGY

## 2021-12-02 PROCEDURE — 87340 HEPATITIS B SURFACE AG IA: CPT

## 2021-12-02 PROCEDURE — 86803 HEPATITIS C AB TEST: CPT

## 2021-12-02 PROCEDURE — 3074F SYST BP LT 130 MM HG: CPT | Performed by: OBSTETRICS & GYNECOLOGY

## 2021-12-02 NOTE — PROGRESS NOTES
HPI:    Patient ID: Deana Gayle is a 51year old year old female. HPI  Well woman visit  65-year-old last menstrual period November 4, history of large uterine fibroids status post multiple abdominal myomectomies totaling 9 in June 2018.   Menstrual normal.  Labia majora and minora without lesions. Vagina- normal, no lesions or discharge. Moist and well supported. Bladder-  nontender. No masses. Normal support.   No evidence of cystocele,  abnormal bladder neck mobility or evident urinary inconti Date: 12/2/2022          Order Specific Question: Release to patient          Answer: Immediate      HIV AG AB Combo          Standing Status: Future          Standing Expiration Date: 12/2/2022          Order Specific Question: Consent obtained?

## 2021-12-03 ENCOUNTER — TELEPHONE (OUTPATIENT)
Dept: OBGYN CLINIC | Facility: CLINIC | Age: 46
End: 2021-12-03

## 2021-12-03 NOTE — TELEPHONE ENCOUNTER
[employer] message sent to pt.    ----- Message from Liliam Leung MD sent at 12/3/2021 11:45 AM CST -----  Please inform by [employer], mail, or call of normal laboratory tests-- cervical culture GC and chlamydia

## 2021-12-03 NOTE — TELEPHONE ENCOUNTER
[employer] message sent to pt.      ----- Message from Khurram Moss MD sent at 12/3/2021 11:15 AM CST -----  Please inform by [employer], mail, or call of normal laboratory tests-- STD lab work.   Syphilis pending

## 2021-12-08 ENCOUNTER — TELEPHONE (OUTPATIENT)
Dept: OBGYN CLINIC | Facility: CLINIC | Age: 46
End: 2021-12-08

## 2021-12-08 NOTE — TELEPHONE ENCOUNTER
Spoke with Sabine at lab, states that they are running the Genotype for HPV, it just hasn't been resulted yet. Routing to [employer] as FYI.

## 2021-12-09 NOTE — TELEPHONE ENCOUNTER
Spoke with Sabine from lab. Informed this RN genotyping will done today (Genotyping only done on Tuesday and Thursday). States she can see order for genotyping in patient chart on her end.  Routing as [employer]

## 2021-12-12 ENCOUNTER — TELEPHONE (OUTPATIENT)
Dept: OBGYN CLINIC | Facility: CLINIC | Age: 46
End: 2021-12-12

## 2021-12-12 NOTE — TELEPHONE ENCOUNTER
We are still waiting HR HPV typing from [facility] lab. It has been quite a few days now. Please contact again to inquire.

## 2021-12-13 NOTE — TELEPHONE ENCOUNTER
Patient had pap run by our hospital lab. Genotype still processing as per lab they only do genotyping on Tuesdays and thursdays.

## 2021-12-15 NOTE — TELEPHONE ENCOUNTER
Khurram Moss MD   12/15/2021  7:09 AM CST Back to Top        Please inform that her Pap test did not show any cells concerning for cancer or precancer.  The HPV cotest was positive but additional testing for concerning high risk type HPV was negative.

## 2021-12-16 ENCOUNTER — NURSE ONLY (OUTPATIENT)
Dept: GASTROENTEROLOGY | Facility: CLINIC | Age: 46
End: 2021-12-16

## 2021-12-16 NOTE — PROGRESS NOTES
Called patient for her scheduled telephone colon screening. Patient states she is experiencing white discharge when passing stool & heartburn associated with pressure/pain in  chest after meals.      Patient added her father has a history of colon polyp

## 2021-12-17 ENCOUNTER — OFFICE VISIT (OUTPATIENT)
Dept: DERMATOLOGY CLINIC | Facility: CLINIC | Age: 46
End: 2021-12-17
Payer: COMMERCIAL

## 2021-12-17 DIAGNOSIS — L73.9 FOLLICULITIS: ICD-10-CM

## 2021-12-17 DIAGNOSIS — D22.9 BENIGN MOLE: Primary | ICD-10-CM

## 2021-12-17 PROCEDURE — 99203 OFFICE O/P NEW LOW 30 MIN: CPT | Performed by: PHYSICIAN ASSISTANT

## 2021-12-17 RX ORDER — CLINDAMYCIN PHOSPHATE 10 MG/ML
1 LOTION TOPICAL DAILY
Qty: 60 ML | Refills: 0 | Status: SHIPPED | OUTPATIENT
Start: 2021-12-17

## 2021-12-17 NOTE — PROGRESS NOTES
HPI:    Patient ID: Lamberto Dow is a 51year old female. Patient presents with a bump on right gluteus. No pain or itch noted. Has used heat on the area with no relief for the past several months. Has noted pus draining at times.  No tenderness note weeks.   -To call or follow-up with worsening symptoms or concerns.   -Pt was agreeable to plan and will comply with discussion above. No orders of the defined types were placed in this encounter.       Meds This Visit:  Requested Prescriptions

## 2021-12-20 NOTE — H&P
[facility] - Gastroenterology                                                                                                               Reason for consult: eval    Requesting physician or provider: Enrique Padilla Procedure Laterality Date   • LASIK     • LEEP      for mild dysplasia   • MYOMECTOMY 5/> INTRAMURAL MYOMAS &/OR TOTAL WT >250 GMS, ABDOMINAL APPROACH  2018    She was told she would need  deliveries   • ORAL SURGERY PROCEDURE      Cys normal bowel sounds, soft, non-tender, non-distended no rebound or guarding, no masses, no hepatomegaly  MSK: No redness, no warmth, no swelling of joints  SKIN: No jaundice, no erythema, no rashes  HEMATOLOGIC: No bleeding, no bruising  NEURO: Alert and i tested for COVID within 72 hours of your procedure.   You will be contacted with instructions on how to do this.       >>>Please note: if you were prescribed Suprep for the bowel prep and it is too expensive or not covered by insurance, it is okay to substi

## 2021-12-22 ENCOUNTER — TELEPHONE (OUTPATIENT)
Dept: GASTROENTEROLOGY | Facility: CLINIC | Age: 46
End: 2021-12-22

## 2021-12-22 ENCOUNTER — OFFICE VISIT (OUTPATIENT)
Dept: GASTROENTEROLOGY | Facility: CLINIC | Age: 46
End: 2021-12-22
Payer: COMMERCIAL

## 2021-12-22 VITALS
TEMPERATURE: 99 F | HEIGHT: 62 IN | BODY MASS INDEX: 25.73 KG/M2 | HEART RATE: 94 BPM | SYSTOLIC BLOOD PRESSURE: 133 MMHG | DIASTOLIC BLOOD PRESSURE: 84 MMHG | WEIGHT: 139.81 LBS

## 2021-12-22 DIAGNOSIS — R12 HEARTBURN: Primary | ICD-10-CM

## 2021-12-22 DIAGNOSIS — Z12.11 COLON CANCER SCREENING: ICD-10-CM

## 2021-12-22 DIAGNOSIS — Z12.11 COLON CANCER SCREENING: Primary | ICD-10-CM

## 2021-12-22 DIAGNOSIS — R19.5 STOOL COLOR ABNORMAL: ICD-10-CM

## 2021-12-22 PROCEDURE — 3075F SYST BP GE 130 - 139MM HG: CPT | Performed by: NURSE PRACTITIONER

## 2021-12-22 PROCEDURE — 3079F DIAST BP 80-89 MM HG: CPT | Performed by: NURSE PRACTITIONER

## 2021-12-22 PROCEDURE — 3008F BODY MASS INDEX DOCD: CPT | Performed by: NURSE PRACTITIONER

## 2021-12-22 PROCEDURE — 99244 OFF/OP CNSLTJ NEW/EST MOD 40: CPT | Performed by: NURSE PRACTITIONER

## 2021-12-22 RX ORDER — POLYETHYLENE GLYCOL 3350, SODIUM CHLORIDE, SODIUM BICARBONATE, POTASSIUM CHLORIDE 420; 11.2; 5.72; 1.48 G/4L; G/4L; G/4L; G/4L
POWDER, FOR SOLUTION ORAL
Qty: 4000 ML | Refills: 0 | Status: SHIPPED | OUTPATIENT
Start: 2021-12-22

## 2021-12-22 NOTE — TELEPHONE ENCOUNTER
Scheduled for:  Colonoscopy 37360  Provider Name:  Dr Abdelrahman Hidalgo  Date:  1/5/2022  Location:  [facility]  Sedation:  MAC  Time:  9:00am (pt is aware to arrive at 8:00am)  Prep:  Trilyte  Meds/Allergies Reconciled?:  Mable/NP reviewed.    Diagnosis with codes:  [place]

## 2021-12-27 ENCOUNTER — TELEPHONE (OUTPATIENT)
Dept: INTERNAL MEDICINE CLINIC | Facility: HOSPITAL | Age: 46
End: 2021-12-27

## 2021-12-27 NOTE — TELEPHONE ENCOUNTER
Awaiting emailed copy of outside results    Outside Covid Testing done    Results and RTW guidelines:    COVID RESULT reported:      Test type:    [x] Rapid outside         [x] PCR outside           Both were positive    Date of test: 12/24/21    Test lo Diarrhea/Vomiting free for 24 hours w/o medications  [] Alinity ordered; continue to monitor sx and call for new/worsening sx.   Discuss RTW guidelines with manager  [] May continue to work  [] If test result is negative, return to work after 7 days from ex Yes []        • Headache                   Yes [x]             • GI symptoms             Yes []     No [x]                     Nausea   []          Vomiting            []                                    Diarrhea  []          Upset stomach []

## 2021-12-28 ENCOUNTER — TELEPHONE (OUTPATIENT)
Dept: GASTROENTEROLOGY | Facility: CLINIC | Age: 46
End: 2021-12-28

## 2021-12-28 DIAGNOSIS — Z12.11 COLON CANCER SCREENING: Primary | ICD-10-CM

## 2021-12-30 NOTE — TELEPHONE ENCOUNTER
Spoke with pt, she is unaware of her work schedule at this time. Pt will call back when she is able to reschedule. Please transfer to Argenis Velazquez at ext [phone] for scheduling.

## 2021-12-30 NOTE — TELEPHONE ENCOUNTER
Called patient to help assist with scheduling procedure.      [facility]

## 2022-01-21 ENCOUNTER — TELEPHONE (OUTPATIENT)
Dept: GASTROENTEROLOGY | Facility: CLINIC | Age: 47
End: 2022-01-21

## 2022-06-04 ENCOUNTER — PATIENT MESSAGE (OUTPATIENT)
Dept: INTERNAL MEDICINE CLINIC | Facility: CLINIC | Age: 47
End: 2022-06-04

## 2022-06-06 ENCOUNTER — TELEPHONE (OUTPATIENT)
Dept: INTERNAL MEDICINE CLINIC | Facility: CLINIC | Age: 47
End: 2022-06-06

## 2022-06-06 DIAGNOSIS — N30.00 ACUTE CYSTITIS WITHOUT HEMATURIA: Primary | ICD-10-CM

## 2022-06-06 RX ORDER — SULFAMETHOXAZOLE AND TRIMETHOPRIM 800; 160 MG/1; MG/1
1 TABLET ORAL 2 TIMES DAILY
Qty: 10 TABLET | Refills: 0 | Status: SHIPPED | OUTPATIENT
Start: 2022-06-06

## 2022-06-06 NOTE — TELEPHONE ENCOUNTER
If symptoms do not improve, then patient should keep appointment.   If symptoms resolve, no need to keep appointment

## 2022-06-06 NOTE — TELEPHONE ENCOUNTER
Spoke to pt and relayed MD messages and instructions below. Pt verbalized understanding and agrees with plan. Pt will call back if symptoms resolve and she needs to cancel appt.

## 2022-06-06 NOTE — TELEPHONE ENCOUNTER
Patient is calling back, no nurse available. Patient was asked what her current symptoms are at this time; frequency, little bit coming out at a time, discomfort. Denies any fever. Patient took over the counter mediation, which seems to be helping some, but enough.     Pharmacy: [facility] in [facility] on [address] [id number]

## 2022-06-06 NOTE — TELEPHONE ENCOUNTER
We will empirically treat as probable UTI with Bactrim double strength twice daily x5 days. Prescription sent to pharmacy. If no symptomatic improvement, will need urinalysis with culture reflex.

## 2022-06-06 NOTE — TELEPHONE ENCOUNTER
Noted. Dr. Justin Becker, would you like pt to keep 6/8 appt?      Nursing to call pt with MD response and message below

## 2022-06-06 NOTE — TELEPHONE ENCOUNTER
Pt scheduled mychart appt for 6/8/22 for:    \"Possibly a UTI\"    Norlene Ruffing for patient to wait to be seen?     Tasked to nursing

## 2022-06-06 NOTE — TELEPHONE ENCOUNTER
From: Colette Champion  To: Kindra Lafleur MD  Sent: 6/4/2022 3:55 PM CDT  Subject: UTI    I was wondering if there is someone who can see me on Mon. I'm off that day from work and feel very uncomfortable if it's a UTI that I'm dealing with. I will greatly appreciate your assistance.      Thanks,   Kendell Bowman

## 2022-06-20 ENCOUNTER — PATIENT MESSAGE (OUTPATIENT)
Dept: INTERNAL MEDICINE CLINIC | Facility: CLINIC | Age: 47
End: 2022-06-20

## 2022-06-20 DIAGNOSIS — N30.00 ACUTE CYSTITIS WITHOUT HEMATURIA: Primary | ICD-10-CM

## 2022-06-20 NOTE — TELEPHONE ENCOUNTER
UTI symptoms:    []Frequency  []Urgency  []Pain/burning  []Blood in urine  []Low back pain  []Flank pain  []Fevers/chills  []Odor  []Confusion    NOTES:    Please advise - patient states she feels pressure when starting to urinate , once she goes she is ok - to [facility]  Orders pending

## 2022-06-21 RX ORDER — SULFAMETHOXAZOLE AND TRIMETHOPRIM 800; 160 MG/1; MG/1
1 TABLET ORAL 2 TIMES DAILY
Qty: 10 TABLET | Refills: 0 | Status: SHIPPED | OUTPATIENT
Start: 2022-06-21

## 2022-06-21 NOTE — TELEPHONE ENCOUNTER
I ordered course of Bactrim. But lets try to get a urinalysis with culture reflex prior to patient starting Bactrim if possible--this is also been ordered.

## 2022-08-11 ENCOUNTER — PATIENT MESSAGE (OUTPATIENT)
Dept: INTERNAL MEDICINE CLINIC | Facility: CLINIC | Age: 47
End: 2022-08-11

## 2022-08-11 DIAGNOSIS — Z12.31 ENCOUNTER FOR SCREENING MAMMOGRAM FOR MALIGNANT NEOPLASM OF BREAST: Primary | ICD-10-CM

## 2022-08-11 NOTE — TELEPHONE ENCOUNTER
Mammo orders placed per protocol. Order sent to pt via [employer]. From: Lorena Mcgarry  To: Levi Ovalles MD  Sent: 8/11/2022 11:10 AM CDT  Subject: Mammo annual referral     Hello,  I received a message to schedule an appointment for my screening. Can you place a  referral for me please. I will greatly appreciate it.      Thanks,   Mendy Stoddard

## 2022-08-15 ENCOUNTER — PATIENT MESSAGE (OUTPATIENT)
Dept: INTERNAL MEDICINE CLINIC | Facility: CLINIC | Age: 47
End: 2022-08-15

## 2022-08-15 NOTE — TELEPHONE ENCOUNTER
From: Drew Franz  Sent: 8/15/2022 2:32 PM CDT  To: Rebekah Can Clinical Staff  Subject: Mammo annual referral     Thank you

## 2022-09-13 ENCOUNTER — HOSPITAL ENCOUNTER (OUTPATIENT)
Dept: MAMMOGRAPHY | Facility: HOSPITAL | Age: 47
Discharge: HOME | End: 2022-09-13
Attending: INTERNAL MEDICINE
Payer: COMMERCIAL

## 2022-09-13 DIAGNOSIS — Z12.31 ENCOUNTER FOR SCREENING MAMMOGRAM FOR MALIGNANT NEOPLASM OF BREAST: ICD-10-CM

## 2022-09-13 PROCEDURE — 77063 BREAST TOMOSYNTHESIS BI: CPT | Performed by: INTERNAL MEDICINE

## 2022-09-13 PROCEDURE — 77067 SCR MAMMO BI INCL CAD: CPT | Performed by: INTERNAL MEDICINE

## 2022-10-25 ENCOUNTER — IMMUNIZATION (OUTPATIENT)
Dept: LAB | Facility: HOSPITAL | Age: 47
End: 2022-10-25
Attending: PREVENTIVE MEDICINE
Payer: COMMERCIAL

## 2022-10-25 DIAGNOSIS — Z23 NEED FOR VACCINATION: Primary | ICD-10-CM

## 2022-10-25 PROCEDURE — 90471 IMMUNIZATION ADMIN: CPT

## 2022-10-25 PROCEDURE — 0124A SARSCOV2 VAC BVL 30MCG/0.3ML: CPT

## 2022-11-10 ENCOUNTER — OFFICE VISIT (OUTPATIENT)
Dept: OTHER | Facility: HOSPITAL | Age: 47
End: 2022-11-10
Attending: EMERGENCY MEDICINE

## 2022-11-10 ENCOUNTER — HOSPITAL ENCOUNTER (OUTPATIENT)
Dept: GENERAL RADIOLOGY | Facility: HOSPITAL | Age: 47
Discharge: HOME | End: 2022-11-10
Attending: EMERGENCY MEDICINE

## 2022-11-10 DIAGNOSIS — R52 PAIN: ICD-10-CM

## 2022-11-10 DIAGNOSIS — R52 PAIN: Primary | ICD-10-CM

## 2022-11-10 PROCEDURE — 73080 X-RAY EXAM OF ELBOW: CPT | Performed by: EMERGENCY MEDICINE

## 2022-11-10 PROCEDURE — 73110 X-RAY EXAM OF WRIST: CPT | Performed by: EMERGENCY MEDICINE

## 2022-11-17 ENCOUNTER — ORDER TRANSCRIPTION (OUTPATIENT)
Dept: PHYSICAL THERAPY | Facility: HOSPITAL | Age: 47
End: 2022-11-17

## 2022-11-17 ENCOUNTER — TELEPHONE (OUTPATIENT)
Dept: PHYSICAL THERAPY | Facility: HOSPITAL | Age: 47
End: 2022-11-17

## 2022-11-17 ENCOUNTER — OFFICE VISIT (OUTPATIENT)
Dept: OTHER | Facility: HOSPITAL | Age: 47
End: 2022-11-17
Attending: EMERGENCY MEDICINE
Payer: COMMERCIAL

## 2022-11-17 DIAGNOSIS — M25.521 RIGHT ELBOW PAIN: Primary | ICD-10-CM

## 2022-11-17 DIAGNOSIS — R52 PAIN: Primary | ICD-10-CM

## 2022-11-29 ENCOUNTER — OFFICE VISIT (OUTPATIENT)
Dept: OBGYN CLINIC | Facility: CLINIC | Age: 47
End: 2022-11-29
Payer: COMMERCIAL

## 2022-11-29 VITALS
HEIGHT: 62 IN | DIASTOLIC BLOOD PRESSURE: 68 MMHG | WEIGHT: 141.63 LBS | BODY MASS INDEX: 26.06 KG/M2 | SYSTOLIC BLOOD PRESSURE: 124 MMHG

## 2022-11-29 DIAGNOSIS — Z11.3 SCREENING EXAMINATION FOR STD (SEXUALLY TRANSMITTED DISEASE): ICD-10-CM

## 2022-11-29 DIAGNOSIS — Z01.419 ENCOUNTER FOR WELL WOMAN EXAM WITH ROUTINE GYNECOLOGICAL EXAM: Primary | ICD-10-CM

## 2022-11-29 DIAGNOSIS — D25.2 INTRAMURAL AND SUBSEROUS LEIOMYOMA OF UTERUS: ICD-10-CM

## 2022-11-29 DIAGNOSIS — N89.8 VAGINAL DISCHARGE: ICD-10-CM

## 2022-11-29 DIAGNOSIS — D25.1 INTRAMURAL AND SUBSEROUS LEIOMYOMA OF UTERUS: ICD-10-CM

## 2022-11-29 PROCEDURE — 3078F DIAST BP <80 MM HG: CPT | Performed by: OBSTETRICS & GYNECOLOGY

## 2022-11-29 PROCEDURE — 3008F BODY MASS INDEX DOCD: CPT | Performed by: OBSTETRICS & GYNECOLOGY

## 2022-11-29 PROCEDURE — 99212 OFFICE O/P EST SF 10 MIN: CPT | Performed by: OBSTETRICS & GYNECOLOGY

## 2022-11-29 PROCEDURE — 99396 PREV VISIT EST AGE 40-64: CPT | Performed by: OBSTETRICS & GYNECOLOGY

## 2022-11-29 PROCEDURE — 3074F SYST BP LT 130 MM HG: CPT | Performed by: OBSTETRICS & GYNECOLOGY

## 2022-11-30 ENCOUNTER — TELEPHONE (OUTPATIENT)
Dept: OBGYN CLINIC | Facility: CLINIC | Age: 47
End: 2022-11-30

## 2022-11-30 ENCOUNTER — LAB ENCOUNTER (OUTPATIENT)
Dept: LAB | Facility: HOSPITAL | Age: 47
End: 2022-11-30
Attending: OBSTETRICS & GYNECOLOGY
Payer: COMMERCIAL

## 2022-11-30 DIAGNOSIS — Z11.3 SCREENING EXAMINATION FOR STD (SEXUALLY TRANSMITTED DISEASE): ICD-10-CM

## 2022-11-30 LAB
C TRACH DNA SPEC QL NAA+PROBE: NEGATIVE
HBV SURFACE AG SER-ACNC: <0.1 [IU]/L
HBV SURFACE AG SERPL QL IA: NONREACTIVE
HCV AB SERPL QL IA: NONREACTIVE
N GONORRHOEA DNA SPEC QL NAA+PROBE: NEGATIVE

## 2022-11-30 PROCEDURE — 87340 HEPATITIS B SURFACE AG IA: CPT

## 2022-11-30 PROCEDURE — 87389 HIV-1 AG W/HIV-1&-2 AB AG IA: CPT

## 2022-11-30 PROCEDURE — 36415 COLL VENOUS BLD VENIPUNCTURE: CPT

## 2022-11-30 PROCEDURE — 86803 HEPATITIS C AB TEST: CPT

## 2022-11-30 PROCEDURE — 86780 TREPONEMA PALLIDUM: CPT

## 2022-11-30 RX ORDER — METRONIDAZOLE 500 MG/1
500 TABLET ORAL 2 TIMES DAILY
Qty: 14 TABLET | Refills: 0 | Status: SHIPPED | OUTPATIENT
Start: 2022-11-30 | End: 2022-12-07

## 2022-11-30 NOTE — TELEPHONE ENCOUNTER
----- Message from Meredith Overton MD sent at 11/30/2022 12:27 PM CST -----  Please inform patient that her vaginal culture was positive for bacterial vaginosis (BV). This is due to an overgrowth of vaginal bacteria. It is readily treated with an antibiotic tablet or cream.  We will be sending a treatment to her pharmacy. If she is interested in pills, order Flagyl 500 mg PO BID x 7 days. If she is interested in a vaginal gel, order Metrogel Vaginal 6.76% gel one applicatorful PV x 5 days.

## 2022-11-30 NOTE — TELEPHONE ENCOUNTER
Pt name and  verified. Result reviewed with pt. Pt prefers oral medication. Prescription sent to pt preferred pharmacy.

## 2022-12-01 LAB
GENITAL VAGINOSIS SCREEN: POSITIVE
TRICHOMONAS SCREEN: NEGATIVE

## 2022-12-02 LAB — T PALLIDUM AB SER QL: NEGATIVE

## 2022-12-06 LAB — HPV I/H RISK 1 DNA SPEC QL NAA+PROBE: NEGATIVE

## 2022-12-07 ENCOUNTER — TELEPHONE (OUTPATIENT)
Dept: OBGYN CLINIC | Facility: CLINIC | Age: 47
End: 2022-12-07

## 2022-12-07 NOTE — TELEPHONE ENCOUNTER
----- Message from Mendel Ralphs, MD sent at 12/7/2022  1:26 PM CST -----  Please inform patient that her Pap test and HPV co-test was normal. One of the changes noted was a shift towards bacterial shai suggesting bacterial vaginosis. This is an overgrowth of the normally present bacteria in the vagina. She is already on treatment.

## 2022-12-08 ENCOUNTER — OFFICE VISIT (OUTPATIENT)
Dept: OCCUPATIONAL MEDICINE | Facility: HOSPITAL | Age: 47
End: 2022-12-08
Attending: EMERGENCY MEDICINE
Payer: OTHER MISCELLANEOUS

## 2022-12-08 ENCOUNTER — APPOINTMENT (OUTPATIENT)
Dept: OTHER | Facility: HOSPITAL | Age: 47
End: 2022-12-08
Attending: EMERGENCY MEDICINE

## 2022-12-08 DIAGNOSIS — R52 PAIN: ICD-10-CM

## 2022-12-08 PROCEDURE — 97166 OT EVAL MOD COMPLEX 45 MIN: CPT | Performed by: OCCUPATIONAL THERAPIST

## 2022-12-08 PROCEDURE — 97140 MANUAL THERAPY 1/> REGIONS: CPT | Performed by: OCCUPATIONAL THERAPIST

## 2022-12-08 NOTE — PROGRESS NOTES
OCCUPATIONAL THERAPY UPPER EXTREMITY EVALUATION:   Referring Physician: Dr. Marquez Coker  Date of onset: 11/20/22  Diagnosis: Right elbow pain Date of Service: 11/08/22       PATIENT SUMMARY:   Itzel Jalloh is a 49year old y/o female who presents to therapy today with complaints of pain in right medial elbow with lifting or pulling. Pt describes pain level : at rest pain 1/10, with lifting heavy items 8/10  History of current condition: Patient injured right wrist and elbow while trying to   Current functional limitations include: open jar, help move patient, move machines at work  Lizeth describes prior level of function : using right UE independently for all self care and work activities. Employment: Working restricted duty  Hand Dominance: right  Living Situation: w/ significant other    Pt goals include: to reduce pain in right elbow so resume regular activities  Past medical history was reviewed with Dimitri Hernandez Significant findings include: right shoulder pain        ASSESSMENT:   Itzel Jalloh is a pleasant middle age woman who came to therapy complaining of pain in right forearm and elbow. 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 She lives with her significant other in an apartment. Prior to injury she had been work out but currently unable to. Assessment indicates tenderness at right medial epicondyle. She is positive for the following provocative tests: Resisted wrist flexion, Resisted digit FDS/FDP, Resisted forearm pronation. She has decreased right wrist extension, decreased right  strength and pain with gripping in stress position. Given the above noted deficits in ROM, pain,and strength, patient presents with impairments in occupation- based task performance for self care skills, work and leisure skills.   Lizeth could benefit from continued skilled OT to address the subcomponents of ROM, pain and strength to allow her the ability to regain above- noted skills. In agreement with FOTO score and clinical rationale this evaluation involved  Moderate complexity decision making due to 3 performance deficits, as well as nomodifications of tasks or assistance. Also involves a brief review of occupational profile and medical and therapy history. Precautions: None        OBJECTIVE:   OBSERVATION: Patient seen for OT evaluation , STM and HEP instruction.     ORTHOTICS: recommend counter force brace    AROM: bilateral shoulder, elbow and forearm are WNL  Wrist   Flexion: R 80, L 80  Extension: R 65, L 75         DIGIT ROM: bilateral digits are WNL    MANUAL MUSCLE TESTING:        Right  Left  Shoulder flex/ext 5/5  5/5  Elbow flex/ext  5/5  5/5  Wrist flex/ext  5/5  5/5    Strength (lbs) Right            Trial          1           2            3          Average Left               Trial           1           2            3          Average   : 28 34 24 29    42 35 37 38                       :                 Stressed position 13       40             Today's Treatment: Patient education provided on diagnosis, POC and HEP Pt was instructed in and issued a HEP for: wrist stretches and ice massage  Date 12/08/22                 Visit # 1                                    Evaluation x                Manual                   STM forearm flexors 5 min                  ice massage instructions  5 min                                     Ther ex                   Wrist stretches x4                                                                                                                                       HEP instruction   see below                                     Therapeutic Activity                                       Neuromuscular Re-education                                                             Modalities                                                                 Charges: OT Eval x1, MT    Total Timed Treatment: 13 min     Total Treatment Time: 45 min       PLAN OF CARE:   Goals:      Pt complaints of pain in right elbow will decrease at worst to 1/10. Pt will be independent and compliant with comprehensive HEP to maintain progress achieved in OT. Patient will demonstrate increase in right wrist extensor to at least 75 degrees for ease in pushing off of table. Patient will demonstrate increase in right  strength to at least 35 lb for ease in moving equipment at work. Patient will test negative for the following provocative test: Resisted wrist flexion and Resisted FDS/FDP. Frequency / Duration: Patient will be seen for 2 x/week or a total of 6 visits over a 30 day period. Treatment will include: Manual Therapy, Soft tissue mobilization, AROM, PROM, Strengthening, Therapeutic Activity, Moist heat, cryotherapy, Ultrasound, Whirlpool (fluidotherapy), Paraffin, Electrical Stim, kinesiotape, Orthosis,  Neuromuscular Re-education, Patient education, Home exercise program,     Education or treatment limitation: None  Rehab Potential:good     FOTO: 59/100  Patient was advised of these findings, precautions, and treatment options and has agreed to actively participate in planning and for this course of care. Thank you for your referral. Please co-sign or sign and return this letter via fax as soon as possible to [phone]. If you have any questions, please contact me at Dept: [phone]    Sincerely,  Electronically signed by therapist: LIZZ Hua/L, CHT    [de-identified] certification required: Yes  I certify the need for these services furnished under this plan of treatment and while under my care.         X______________________________________________ Date________________  Certification From: 94/86/60      To: 01/08/23

## 2022-12-12 ENCOUNTER — TELEPHONE (OUTPATIENT)
Dept: OCCUPATIONAL MEDICINE | Facility: HOSPITAL | Age: 47
End: 2022-12-12

## 2022-12-12 ENCOUNTER — APPOINTMENT (OUTPATIENT)
Dept: OCCUPATIONAL MEDICINE | Facility: HOSPITAL | Age: 47
End: 2022-12-12
Attending: EMERGENCY MEDICINE
Payer: OTHER MISCELLANEOUS

## 2022-12-13 ENCOUNTER — HOSPITAL ENCOUNTER (OUTPATIENT)
Dept: MRI IMAGING | Facility: HOSPITAL | Age: 47
Discharge: HOME | End: 2022-12-13
Attending: ORTHOPAEDIC SURGERY
Payer: OTHER MISCELLANEOUS

## 2022-12-13 DIAGNOSIS — M25.521 RIGHT ELBOW PAIN: ICD-10-CM

## 2022-12-13 PROCEDURE — 73221 MRI JOINT UPR EXTREM W/O DYE: CPT | Performed by: ORTHOPAEDIC SURGERY

## 2022-12-14 ENCOUNTER — OFFICE VISIT (OUTPATIENT)
Dept: OCCUPATIONAL MEDICINE | Facility: HOSPITAL | Age: 47
End: 2022-12-14
Attending: EMERGENCY MEDICINE
Payer: OTHER MISCELLANEOUS

## 2022-12-14 PROCEDURE — 97140 MANUAL THERAPY 1/> REGIONS: CPT | Performed by: OCCUPATIONAL THERAPIST

## 2022-12-14 PROCEDURE — 97035 APP MDLTY 1+ULTRASOUND EA 15: CPT | Performed by: OCCUPATIONAL THERAPIST

## 2022-12-14 NOTE — PROGRESS NOTES
Dx: Right elbow pain     Authorized # of Visits:  6         Next MD visit: none scheduled  Fall Risk: standard         Precautions: None       Medication Changes since last visit?: No  Subjective: \"I had an MRI the other day and it didn't show anything, I still have the pain. \"  Objective: Treatment began with moist heat follow by IASTM, stretching, MFR, ultrasound and kinesiotape. See flow sheet for details      Date 12/08/22 12/14/22               Visit # 1  2                                  Evaluation x                Manual                   STM forearm flexors 5 min  IASTM right forearm flexors 5 min                ice massage instructions  5 min  MFR in supine 5 min                kinesiotape distal to proximal along forearm flexors    x               Ther ex                   Wrist stretches x4  blue web stretch wrist flex/ext 10 sec x 5                                                                                                                                     HEP instruction   see below                                     Therapeutic Activity                                       Neuromuscular Re-education                                                             Modalities                    Moist heat    3 min                pulsed ultrasound 3.0 MHZ,1.2 W/cm2    8 min                   Assessment: Patient continues to report tenderness at medial elbow. Treated with heat, stretching, MFR, ultrasound and kinesiotape to reduce pain. Goals:   Pt complaints of pain in right elbow will decrease at worst to 1/10. Pt will be independent and compliant with comprehensive HEP to maintain progress achieved in OT. Patient will demonstrate increase in right wrist extensor to at least 75 degrees for ease in pushing off of table. Patient will demonstrate increase in right  strength to at least 35 lb for ease in moving equipment at work.   Patient will test negative for the following provocative test: Resisted wrist flexion and Resisted FDS/FDP.       Plan: Continue with POC    Charges: MT,US       Total Timed Treatment: 30 min  Total Treatment Time: 35 min

## 2022-12-19 ENCOUNTER — OFFICE VISIT (OUTPATIENT)
Dept: OCCUPATIONAL MEDICINE | Facility: HOSPITAL | Age: 47
End: 2022-12-19
Attending: EMERGENCY MEDICINE
Payer: OTHER MISCELLANEOUS

## 2022-12-19 PROCEDURE — 97110 THERAPEUTIC EXERCISES: CPT | Performed by: OCCUPATIONAL THERAPIST

## 2022-12-19 PROCEDURE — 97035 APP MDLTY 1+ULTRASOUND EA 15: CPT | Performed by: OCCUPATIONAL THERAPIST

## 2022-12-19 PROCEDURE — 97140 MANUAL THERAPY 1/> REGIONS: CPT | Performed by: OCCUPATIONAL THERAPIST

## 2022-12-19 NOTE — PROGRESS NOTES
Dx: Right elbow pain     Authorized # of Visits:  6         Next MD visit: none scheduled  Fall Risk: standard         Precautions: None       Medication Changes since last visit?: No  Subjective: \"The pain is less today, feeling at most 4/10. \"  Objective: Treatment began with moist heat follow by IASTM, stretching, MFR, light strengthening,ultrasound and HEP update See flow sheet for details      Date 12/08/22 12/14/22 12/19/22             Visit # 1  2  3                                Evaluation x                Manual                   STM forearm flexors 5 min  IASTM right forearm flexors 5 min  IASTM right forearm flexors 5 min              ice massage instructions  5 min  MFR in supine 5 min  MFR in supine 5 min              kinesiotape distal to proximal along forearm flexors    x               Ther ex                   Wrist stretches x4  blue web stretch wrist flex/ext 10 sec x 5  blue web wrist flexor stretch 20 sec x 5             Triceps with 2# in supine     x10              Bicep with 2# standing      x10              eccentric PRE supinator with hammer      x10                                                                         HEP instruction   see below                                     Therapeutic Activity                                       Neuromuscular Re-education                                                             Modalities                    Moist heat    3 min  3 min              pulsed 50% ultrasound 3.0 MHZ,1.2 W/cm2    8 min  8 min                 Assessment: Patient reported maintaining kinesiotape along wrist and digit flexors for three days. Reviewed tricep/bicep and supinator PRE with low weights. Patient able to tolerate PRE exercises without increase in pain. Issued as HEP. Pt voiced understanding and performs HEP correctly without reported pain. Goals:   Pt complaints of pain in right elbow will decrease at worst to 1/10.   Pt will be independent and compliant with comprehensive HEP to maintain progress achieved in OT. Patient will demonstrate increase in right wrist extensor to at least 75 degrees for ease in pushing off of table. Patient will demonstrate increase in right  strength to at least 35 lb for ease in moving equipment at work. Patient will test negative for the following provocative test: Resisted wrist flexion and Resisted FDS/FDP.       Plan: Continue to work towards reducing pain and increasing strength in right UE    Charges: MT,TE,US    Total Timed Treatment: 40 min  Total Treatment Time: 45 min

## 2022-12-21 ENCOUNTER — OFFICE VISIT (OUTPATIENT)
Dept: OCCUPATIONAL MEDICINE | Facility: HOSPITAL | Age: 47
End: 2022-12-21
Attending: EMERGENCY MEDICINE
Payer: OTHER MISCELLANEOUS

## 2022-12-21 PROBLEM — Z01.419 ENCOUNTER FOR WELL WOMAN EXAM WITH ROUTINE GYNECOLOGICAL EXAM: Status: RESOLVED | Noted: 2018-11-10 | Resolved: 2022-12-21

## 2022-12-21 PROCEDURE — 97110 THERAPEUTIC EXERCISES: CPT | Performed by: OCCUPATIONAL THERAPIST

## 2022-12-21 PROCEDURE — 97140 MANUAL THERAPY 1/> REGIONS: CPT | Performed by: OCCUPATIONAL THERAPIST

## 2022-12-21 PROCEDURE — 97035 APP MDLTY 1+ULTRASOUND EA 15: CPT | Performed by: OCCUPATIONAL THERAPIST

## 2022-12-21 NOTE — PROGRESS NOTES
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 \"  4/10  Objective: Treatment began with moist heat follow by IASTM, stretching, MFR, light strengthening,ultrasound and HEP update See flow sheet for details. Measurements: AROM right wrist flexion: 80 degrees, extension 75 degrees      Date 12/08/22 12/14/22 12/19/22 12/21/22           Visit # 1  2  3  4                              Evaluation x                Manual                   STM forearm flexors 5 min  IASTM right forearm flexors 5 min  IASTM right forearm flexors 5 min  IASTM , scraping and fanning flexors 8 min            ice massage instructions  5 min  MFR in supine 5 min  MFR in supine 5 min  MFR in supine 5 min            kinesiotape distal to proximal along forearm flexors    x               Ther ex                   Wrist stretches x4  blue web stretch wrist flex/ext 10 sec x 5  blue web wrist flexor stretch 20 sec x 5  blue web wrist flexor stretch 20 sec x 5           Triceps with 2# in supine     x10  Pec stretch on 1/2 foam roll 20 sec x 5            Bicep with 2# standing      x10              eccentric PRE supinator with hammer      x10  x20            scapula posture exercises , \"V\", \"T\" and \"W\"        x10                                                   HEP instruction   see below                                     Therapeutic Activity                                       Neuromuscular Re-education                                                             Modalities                    Moist heat    3 min  3 min  3 min            pulsed 50% ultrasound 3.0 MHZ,1.2 W/cm2    8 min  8 min  8 min               Assessment: Patient reports compliance with performing HEP.  Initiated proximal UE strengthening exercises for scapula today, patient reporting fatigue during exercises. Patient improved right wrist extension by 10 degrees. Tenderness persists in proximal tendon insertion of forearm flexors. Patient has MD appointment next week. Reevaluate next week. Goals:   Pt complaints of pain in right elbow will decrease at worst to 1/10. Emelyn Saba ..(made progress toward)  Pt will be independent and compliant with comprehensive HEP to maintain progress achieved in OT. ...(ongoing)  Patient will demonstrate increase in right wrist extensor to at least 75 degrees for ease in pushing off of table. .. Emelyn Saba (Achieved)  Patient will demonstrate increase in right  strength to at least 35 lb for ease in moving equipment at work. Patient will test negative for the following provocative test: Resisted wrist flexion and Resisted FDS/FDP.       Plan: Continue to work towards reducing pain and increasing strength in right UE    Charges: MT,TE,US    Total Timed Treatment: 40 min  Total Treatment Time: 45 min

## 2022-12-22 ENCOUNTER — LAB ENCOUNTER (OUTPATIENT)
Dept: LAB | Age: 47
End: 2022-12-22
Attending: INTERNAL MEDICINE
Payer: COMMERCIAL

## 2022-12-22 ENCOUNTER — OFFICE VISIT (OUTPATIENT)
Dept: INTERNAL MEDICINE CLINIC | Facility: CLINIC | Age: 47
End: 2022-12-22
Payer: COMMERCIAL

## 2022-12-22 ENCOUNTER — OFFICE VISIT (OUTPATIENT)
Dept: OBGYN CLINIC | Facility: CLINIC | Age: 47
End: 2022-12-22
Payer: COMMERCIAL

## 2022-12-22 VITALS
BODY MASS INDEX: 24.89 KG/M2 | HEART RATE: 88 BPM | SYSTOLIC BLOOD PRESSURE: 130 MMHG | HEIGHT: 62.2 IN | OXYGEN SATURATION: 97 % | TEMPERATURE: 99 F | WEIGHT: 137 LBS | DIASTOLIC BLOOD PRESSURE: 82 MMHG

## 2022-12-22 DIAGNOSIS — D25.1 INTRAMURAL AND SUBSEROUS LEIOMYOMA OF UTERUS: Primary | ICD-10-CM

## 2022-12-22 DIAGNOSIS — Z00.00 PHYSICAL EXAM: ICD-10-CM

## 2022-12-22 DIAGNOSIS — R51.9 NONINTRACTABLE HEADACHE, UNSPECIFIED CHRONICITY PATTERN, UNSPECIFIED HEADACHE TYPE: ICD-10-CM

## 2022-12-22 DIAGNOSIS — N94.6 DYSMENORRHEA: ICD-10-CM

## 2022-12-22 DIAGNOSIS — H91.91 HEARING LOSS OF RIGHT EAR, UNSPECIFIED HEARING LOSS TYPE: ICD-10-CM

## 2022-12-22 DIAGNOSIS — Z00.00 PHYSICAL EXAM: Primary | ICD-10-CM

## 2022-12-22 DIAGNOSIS — N60.19 FIBROCYSTIC BREAST DISEASE (FCBD), UNSPECIFIED LATERALITY: ICD-10-CM

## 2022-12-22 DIAGNOSIS — D25.2 INTRAMURAL AND SUBSEROUS LEIOMYOMA OF UTERUS: Primary | ICD-10-CM

## 2022-12-22 LAB
ALBUMIN SERPL-MCNC: 4.2 G/DL (ref 3.4–5)
ALBUMIN/GLOB SERPL: 1.1 {RATIO} (ref 1–2)
ALP LIVER SERPL-CCNC: 49 U/L
ALT SERPL-CCNC: 21 U/L
ANION GAP SERPL CALC-SCNC: 5 MMOL/L (ref 0–18)
AST SERPL-CCNC: 9 U/L (ref 15–37)
BASOPHILS # BLD AUTO: 0.05 X10(3) UL (ref 0–0.2)
BASOPHILS NFR BLD AUTO: 0.7 %
BILIRUB SERPL-MCNC: 0.7 MG/DL (ref 0.1–2)
BUN BLD-MCNC: 12 MG/DL (ref 7–18)
BUN/CREAT SERPL: 14 (ref 10–20)
CALCIUM BLD-MCNC: 9.7 MG/DL (ref 8.5–10.1)
CHLORIDE SERPL-SCNC: 103 MMOL/L (ref 98–112)
CHOLEST SERPL-MCNC: 217 MG/DL (ref ?–200)
CO2 SERPL-SCNC: 29 MMOL/L (ref 21–32)
CREAT BLD-MCNC: 0.86 MG/DL
DEPRECATED RDW RBC AUTO: 43.5 FL (ref 35.1–46.3)
EOSINOPHIL # BLD AUTO: 0.05 X10(3) UL (ref 0–0.7)
EOSINOPHIL NFR BLD AUTO: 0.7 %
ERYTHROCYTE [DISTWIDTH] IN BLOOD BY AUTOMATED COUNT: 14.9 % (ref 11–15)
EST. AVERAGE GLUCOSE BLD GHB EST-MCNC: 120 MG/DL (ref 68–126)
FASTING PATIENT LIPID ANSWER: YES
FASTING STATUS PATIENT QL REPORTED: YES
GFR SERPLBLD BASED ON 1.73 SQ M-ARVRAT: 83 ML/MIN/1.73M2 (ref 60–?)
GLOBULIN PLAS-MCNC: 3.9 G/DL (ref 2.8–4.4)
GLUCOSE BLD-MCNC: 92 MG/DL (ref 70–99)
HBA1C MFR BLD: 5.8 % (ref ?–5.7)
HCT VFR BLD AUTO: 39 %
HDLC SERPL-MCNC: 67 MG/DL (ref 40–59)
HGB BLD-MCNC: 11.9 G/DL
IMM GRANULOCYTES # BLD AUTO: 0.02 X10(3) UL (ref 0–1)
IMM GRANULOCYTES NFR BLD: 0.3 %
LDLC SERPL CALC-MCNC: 134 MG/DL (ref ?–100)
LYMPHOCYTES # BLD AUTO: 1.94 X10(3) UL (ref 1–4)
LYMPHOCYTES NFR BLD AUTO: 26.7 %
MCH RBC QN AUTO: 24.7 PG (ref 26–34)
MCHC RBC AUTO-ENTMCNC: 30.5 G/DL (ref 31–37)
MCV RBC AUTO: 80.9 FL
MONOCYTES # BLD AUTO: 0.43 X10(3) UL (ref 0.1–1)
MONOCYTES NFR BLD AUTO: 5.9 %
NEUTROPHILS # BLD AUTO: 4.77 X10 (3) UL (ref 1.5–7.7)
NEUTROPHILS # BLD AUTO: 4.77 X10(3) UL (ref 1.5–7.7)
NEUTROPHILS NFR BLD AUTO: 65.7 %
NONHDLC SERPL-MCNC: 150 MG/DL (ref ?–130)
OSMOLALITY SERPL CALC.SUM OF ELEC: 283 MOSM/KG (ref 275–295)
PLATELET # BLD AUTO: 435 10(3)UL (ref 150–450)
POTASSIUM SERPL-SCNC: 4.2 MMOL/L (ref 3.5–5.1)
PROT SERPL-MCNC: 8.1 G/DL (ref 6.4–8.2)
RBC # BLD AUTO: 4.82 X10(6)UL
SODIUM SERPL-SCNC: 137 MMOL/L (ref 136–145)
TRIGL SERPL-MCNC: 88 MG/DL (ref 30–149)
TSI SER-ACNC: 0.61 MIU/ML (ref 0.36–3.74)
VLDLC SERPL CALC-MCNC: 16 MG/DL (ref 0–30)
WBC # BLD AUTO: 7.3 X10(3) UL (ref 4–11)

## 2022-12-22 PROCEDURE — 99396 PREV VISIT EST AGE 40-64: CPT | Performed by: INTERNAL MEDICINE

## 2022-12-22 PROCEDURE — 84443 ASSAY THYROID STIM HORMONE: CPT

## 2022-12-22 PROCEDURE — 85025 COMPLETE CBC W/AUTO DIFF WBC: CPT

## 2022-12-22 PROCEDURE — 3075F SYST BP GE 130 - 139MM HG: CPT | Performed by: INTERNAL MEDICINE

## 2022-12-22 PROCEDURE — 3079F DIAST BP 80-89 MM HG: CPT | Performed by: INTERNAL MEDICINE

## 2022-12-22 PROCEDURE — 80053 COMPREHEN METABOLIC PANEL: CPT

## 2022-12-22 PROCEDURE — 3008F BODY MASS INDEX DOCD: CPT | Performed by: INTERNAL MEDICINE

## 2022-12-22 PROCEDURE — 80061 LIPID PANEL: CPT

## 2022-12-22 PROCEDURE — 83036 HEMOGLOBIN GLYCOSYLATED A1C: CPT

## 2022-12-22 PROCEDURE — 36415 COLL VENOUS BLD VENIPUNCTURE: CPT

## 2022-12-28 ENCOUNTER — TELEPHONE (OUTPATIENT)
Dept: OBGYN CLINIC | Facility: CLINIC | Age: 47
End: 2022-12-28

## 2022-12-28 NOTE — TELEPHONE ENCOUNTER
Patient name and  verified. Patient scheduled for saline sono with [facility] on 2023. Aware of time and location.

## 2022-12-29 ENCOUNTER — APPOINTMENT (OUTPATIENT)
Dept: OCCUPATIONAL MEDICINE | Facility: HOSPITAL | Age: 47
End: 2022-12-29
Attending: EMERGENCY MEDICINE
Payer: OTHER MISCELLANEOUS

## 2023-02-12 ENCOUNTER — PATIENT MESSAGE (OUTPATIENT)
Dept: INTERNAL MEDICINE CLINIC | Facility: CLINIC | Age: 48
End: 2023-02-12

## 2023-02-13 ENCOUNTER — PATIENT MESSAGE (OUTPATIENT)
Dept: INTERNAL MEDICINE CLINIC | Facility: CLINIC | Age: 48
End: 2023-02-13

## 2023-02-13 ENCOUNTER — OFFICE VISIT (OUTPATIENT)
Dept: OBGYN CLINIC | Facility: CLINIC | Age: 48
End: 2023-02-13

## 2023-02-13 VITALS — BODY MASS INDEX: 26 KG/M2 | WEIGHT: 145.19 LBS

## 2023-02-13 DIAGNOSIS — N92.0 MENORRHAGIA WITH REGULAR CYCLE: Primary | ICD-10-CM

## 2023-02-13 DIAGNOSIS — D21.9 FIBROID: ICD-10-CM

## 2023-02-13 NOTE — TELEPHONE ENCOUNTER
From: Darryn Wood  To: Antoinette Hinson MD  Sent: 2/12/2023 2:20 PM CST  Subject: Colonoscopy Screening     [facility],     Can I schedule the appointment without providing a stool sample?     Whitney Morocho

## 2023-02-13 NOTE — TELEPHONE ENCOUNTER
From: Melony Homans  To: Servando Tsang MD  Sent: 2/12/2023 2:20 PM CST  Subject: Colonoscopy Screening     [facility],     Can I schedule the appointment without providing a stool sample?     Mateo Kitchen

## 2023-04-11 ENCOUNTER — OFFICE VISIT (OUTPATIENT)
Dept: INTERNAL MEDICINE CLINIC | Facility: CLINIC | Age: 48
End: 2023-04-11

## 2023-04-11 VITALS
WEIGHT: 144.19 LBS | BODY MASS INDEX: 26.2 KG/M2 | TEMPERATURE: 98 F | HEART RATE: 85 BPM | SYSTOLIC BLOOD PRESSURE: 110 MMHG | DIASTOLIC BLOOD PRESSURE: 80 MMHG | OXYGEN SATURATION: 98 % | HEIGHT: 62.2 IN

## 2023-04-11 DIAGNOSIS — L02.214 ABSCESS OF LEFT GROIN: Primary | ICD-10-CM

## 2023-04-11 PROCEDURE — 3079F DIAST BP 80-89 MM HG: CPT | Performed by: INTERNAL MEDICINE

## 2023-04-11 PROCEDURE — 99213 OFFICE O/P EST LOW 20 MIN: CPT | Performed by: INTERNAL MEDICINE

## 2023-04-11 PROCEDURE — 3008F BODY MASS INDEX DOCD: CPT | Performed by: INTERNAL MEDICINE

## 2023-04-11 PROCEDURE — 3074F SYST BP LT 130 MM HG: CPT | Performed by: INTERNAL MEDICINE

## 2023-04-11 RX ORDER — AMOXICILLIN AND CLAVULANATE POTASSIUM 875; 125 MG/1; MG/1
1 TABLET, FILM COATED ORAL 2 TIMES DAILY
Qty: 20 TABLET | Refills: 0 | Status: SHIPPED | OUTPATIENT
Start: 2023-04-11 | End: 2023-04-21

## 2023-04-13 ENCOUNTER — PATIENT MESSAGE (OUTPATIENT)
Dept: INTERNAL MEDICINE CLINIC | Facility: CLINIC | Age: 48
End: 2023-04-13

## 2023-04-13 NOTE — TELEPHONE ENCOUNTER
To Dr. Corrie Hussein with pt. No improvement in lump/abscess since starting oral antibiotics. Reports more discomfort in area when sitting. States there is now a visible \"white head. \"  Applying hot compress. No fevers/chills/night sweats. No drainage. Pt inquiring how to proceed--please advise.

## 2023-04-13 NOTE — TELEPHONE ENCOUNTER
From: Umer Michele  To: Maricruz Ramirez MD  Sent: 4/13/2023 9:20 AM CDT  Subject: Abscess    Hi,   I feel it's getting bigger. I'm wondering if I can get it drained. I'm having difficulty sitting down without adjusting. Do I need to see a dermatologist? Or can I get it drained there?     Thanks,   Lisa Keating

## 2023-04-14 ENCOUNTER — HOSPITAL ENCOUNTER (EMERGENCY)
Facility: HOSPITAL | Age: 48
Discharge: HOME | End: 2023-04-14
Attending: EMERGENCY MEDICINE
Payer: COMMERCIAL

## 2023-04-14 VITALS
RESPIRATION RATE: 18 BRPM | OXYGEN SATURATION: 96 % | SYSTOLIC BLOOD PRESSURE: 137 MMHG | TEMPERATURE: 98 F | DIASTOLIC BLOOD PRESSURE: 99 MMHG | HEART RATE: 68 BPM

## 2023-04-14 DIAGNOSIS — L02.416 ABSCESS OF LEFT THIGH: Primary | ICD-10-CM

## 2023-04-14 DIAGNOSIS — L03.116 CELLULITIS OF LEFT LOWER EXTREMITY: ICD-10-CM

## 2023-04-14 PROCEDURE — 10061 I&D ABSCESS COMP/MULTIPLE: CPT

## 2023-04-14 PROCEDURE — 99284 EMERGENCY DEPT VISIT MOD MDM: CPT

## 2023-04-14 PROCEDURE — 99283 EMERGENCY DEPT VISIT LOW MDM: CPT

## 2023-04-14 RX ORDER — IBUPROFEN 600 MG/1
600 TABLET ORAL ONCE
Status: COMPLETED | OUTPATIENT
Start: 2023-04-14 | End: 2023-04-14

## 2023-04-14 RX ORDER — SULFAMETHOXAZOLE AND TRIMETHOPRIM 800; 160 MG/1; MG/1
1 TABLET ORAL 2 TIMES DAILY
Qty: 14 TABLET | Refills: 0 | Status: SHIPPED | OUTPATIENT
Start: 2023-04-14 | End: 2023-04-21

## 2023-04-16 ENCOUNTER — HOSPITAL ENCOUNTER (EMERGENCY)
Facility: HOSPITAL | Age: 48
Discharge: HOME | End: 2023-04-16
Attending: EMERGENCY MEDICINE
Payer: COMMERCIAL

## 2023-04-16 VITALS
BODY MASS INDEX: 25.76 KG/M2 | SYSTOLIC BLOOD PRESSURE: 132 MMHG | HEART RATE: 71 BPM | DIASTOLIC BLOOD PRESSURE: 88 MMHG | OXYGEN SATURATION: 96 % | TEMPERATURE: 97 F | HEIGHT: 62 IN | RESPIRATION RATE: 20 BRPM | WEIGHT: 140 LBS

## 2023-04-16 DIAGNOSIS — Z51.89 WOUND CHECK, ABSCESS: ICD-10-CM

## 2023-04-16 DIAGNOSIS — L02.416 ABSCESS OF LEFT THIGH: Primary | ICD-10-CM

## 2023-04-16 PROCEDURE — 99281 EMR DPT VST MAYX REQ PHY/QHP: CPT

## 2023-04-16 NOTE — ED QUICK NOTES
Patient states that she had abscess drained 2 days ago and was told to come in for the wound recheck. Patient state states that packing didn't stay. Denies fevers.

## 2023-04-16 NOTE — ED INITIAL ASSESSMENT (HPI)
Pt reports a left thigh abscess that was drainage here on Friday and was told to come to the ER for a wound check up. Pt reports pain at site.  Pt reports no fever/N/V.

## 2023-06-29 ENCOUNTER — PATIENT MESSAGE (OUTPATIENT)
Dept: INTERNAL MEDICINE CLINIC | Facility: CLINIC | Age: 48
End: 2023-06-29

## 2023-06-30 ENCOUNTER — TELEPHONE (OUTPATIENT)
Dept: INTERNAL MEDICINE CLINIC | Facility: CLINIC | Age: 48
End: 2023-06-30

## 2023-08-09 ENCOUNTER — TELEPHONE (OUTPATIENT)
Dept: INTERNAL MEDICINE CLINIC | Facility: CLINIC | Age: 48
End: 2023-08-09

## 2023-08-09 DIAGNOSIS — Z12.31 ENCOUNTER FOR SCREENING MAMMOGRAM FOR MALIGNANT NEOPLASM OF BREAST: Primary | ICD-10-CM

## 2023-08-09 NOTE — TELEPHONE ENCOUNTER
Patient sent a [employer] message requesting an order for a mammogram and a \"referral for a breat exam\"    Please send patient a MyChart back when order is placed, patient can then call Central Scheduling.

## 2023-09-05 ENCOUNTER — OFFICE VISIT (OUTPATIENT)
Dept: OBGYN CLINIC | Facility: CLINIC | Age: 48
End: 2023-09-05

## 2023-09-05 VITALS
BODY MASS INDEX: 26.53 KG/M2 | DIASTOLIC BLOOD PRESSURE: 104 MMHG | HEIGHT: 62 IN | SYSTOLIC BLOOD PRESSURE: 139 MMHG | WEIGHT: 144.19 LBS | HEART RATE: 86 BPM

## 2023-09-05 DIAGNOSIS — D25.2 INTRAMURAL AND SUBSEROUS LEIOMYOMA OF UTERUS: ICD-10-CM

## 2023-09-05 DIAGNOSIS — N92.0 MENORRHAGIA WITH REGULAR CYCLE: Primary | ICD-10-CM

## 2023-09-05 DIAGNOSIS — D25.1 INTRAMURAL AND SUBSEROUS LEIOMYOMA OF UTERUS: ICD-10-CM

## 2023-09-05 PROCEDURE — 3075F SYST BP GE 130 - 139MM HG: CPT | Performed by: OBSTETRICS & GYNECOLOGY

## 2023-09-05 PROCEDURE — 3080F DIAST BP >= 90 MM HG: CPT | Performed by: OBSTETRICS & GYNECOLOGY

## 2023-09-05 PROCEDURE — 99213 OFFICE O/P EST LOW 20 MIN: CPT | Performed by: OBSTETRICS & GYNECOLOGY

## 2023-09-05 PROCEDURE — 3008F BODY MASS INDEX DOCD: CPT | Performed by: OBSTETRICS & GYNECOLOGY

## 2023-09-13 ENCOUNTER — OFFICE VISIT (OUTPATIENT)
Dept: GASTROENTEROLOGY | Facility: CLINIC | Age: 48
End: 2023-09-13

## 2023-09-13 ENCOUNTER — TELEPHONE (OUTPATIENT)
Dept: GASTROENTEROLOGY | Facility: CLINIC | Age: 48
End: 2023-09-13

## 2023-09-13 VITALS
DIASTOLIC BLOOD PRESSURE: 87 MMHG | SYSTOLIC BLOOD PRESSURE: 129 MMHG | HEART RATE: 75 BPM | BODY MASS INDEX: 26.68 KG/M2 | WEIGHT: 145 LBS | HEIGHT: 62 IN

## 2023-09-13 DIAGNOSIS — R10.9 LEFT SIDED ABDOMINAL PAIN: ICD-10-CM

## 2023-09-13 DIAGNOSIS — R10.10 PAIN OF UPPER ABDOMEN: ICD-10-CM

## 2023-09-13 DIAGNOSIS — Z12.11 COLON CANCER SCREENING: ICD-10-CM

## 2023-09-13 DIAGNOSIS — D64.9 NORMOCYTIC ANEMIA: ICD-10-CM

## 2023-09-13 DIAGNOSIS — Z83.71 FAMILY HISTORY OF COLONIC POLYPS: Primary | ICD-10-CM

## 2023-09-13 DIAGNOSIS — R19.8 ABNORMAL BOWEL MOVEMENT: ICD-10-CM

## 2023-09-13 DIAGNOSIS — Z12.11 COLON CANCER SCREENING: Primary | ICD-10-CM

## 2023-09-13 DIAGNOSIS — Z83.71 FAMILY HISTORY OF COLONIC POLYPS: ICD-10-CM

## 2023-09-13 PROCEDURE — 3074F SYST BP LT 130 MM HG: CPT | Performed by: NURSE PRACTITIONER

## 2023-09-13 PROCEDURE — S0285 CNSLT BEFORE SCREEN COLONOSC: HCPCS | Performed by: NURSE PRACTITIONER

## 2023-09-13 PROCEDURE — 3079F DIAST BP 80-89 MM HG: CPT | Performed by: NURSE PRACTITIONER

## 2023-09-13 PROCEDURE — 3008F BODY MASS INDEX DOCD: CPT | Performed by: NURSE PRACTITIONER

## 2023-09-13 RX ORDER — POLYETHYLENE GLYCOL 3350, SODIUM CHLORIDE, SODIUM BICARBONATE, POTASSIUM CHLORIDE 420; 11.2; 5.72; 1.48 G/4L; G/4L; G/4L; G/4L
POWDER, FOR SOLUTION ORAL
Qty: 4000 ML | Refills: 0 | Status: SHIPPED | OUTPATIENT
Start: 2023-09-13

## 2023-09-14 ENCOUNTER — LAB ENCOUNTER (OUTPATIENT)
Dept: LAB | Facility: HOSPITAL | Age: 48
End: 2023-09-14
Attending: NURSE PRACTITIONER
Payer: COMMERCIAL

## 2023-09-14 DIAGNOSIS — D64.9 NORMOCYTIC ANEMIA: ICD-10-CM

## 2023-09-14 LAB
BASOPHILS # BLD AUTO: 0.08 X10(3) UL (ref 0–0.2)
BASOPHILS NFR BLD AUTO: 1.1 %
DEPRECATED RDW RBC AUTO: 45 FL (ref 35.1–46.3)
EOSINOPHIL # BLD AUTO: 0.09 X10(3) UL (ref 0–0.7)
EOSINOPHIL NFR BLD AUTO: 1.2 %
ERYTHROCYTE [DISTWIDTH] IN BLOOD BY AUTOMATED COUNT: 14.6 % (ref 11–15)
HCT VFR BLD AUTO: 38.3 %
HGB BLD-MCNC: 12.8 G/DL
IMM GRANULOCYTES # BLD AUTO: 0.04 X10(3) UL (ref 0–1)
IMM GRANULOCYTES NFR BLD: 0.5 %
LYMPHOCYTES # BLD AUTO: 2.83 X10(3) UL (ref 1–4)
LYMPHOCYTES NFR BLD AUTO: 38.1 %
MCH RBC QN AUTO: 28.5 PG (ref 26–34)
MCHC RBC AUTO-ENTMCNC: 33.4 G/DL (ref 31–37)
MCV RBC AUTO: 85.3 FL
MONOCYTES # BLD AUTO: 0.4 X10(3) UL (ref 0.1–1)
MONOCYTES NFR BLD AUTO: 5.4 %
NEUTROPHILS # BLD AUTO: 3.98 X10 (3) UL (ref 1.5–7.7)
NEUTROPHILS # BLD AUTO: 3.98 X10(3) UL (ref 1.5–7.7)
NEUTROPHILS NFR BLD AUTO: 53.7 %
PLATELET # BLD AUTO: 368 10(3)UL (ref 150–450)
RBC # BLD AUTO: 4.49 X10(6)UL
WBC # BLD AUTO: 7.4 X10(3) UL (ref 4–11)

## 2023-09-14 PROCEDURE — 36415 COLL VENOUS BLD VENIPUNCTURE: CPT

## 2023-09-14 PROCEDURE — 85025 COMPLETE CBC W/AUTO DIFF WBC: CPT

## 2023-09-15 ENCOUNTER — HOSPITAL ENCOUNTER (OUTPATIENT)
Dept: MAMMOGRAPHY | Age: 48
Discharge: HOME | End: 2023-09-15
Attending: INTERNAL MEDICINE
Payer: COMMERCIAL

## 2023-09-15 DIAGNOSIS — Z12.31 ENCOUNTER FOR SCREENING MAMMOGRAM FOR MALIGNANT NEOPLASM OF BREAST: ICD-10-CM

## 2023-09-15 PROCEDURE — 77063 BREAST TOMOSYNTHESIS BI: CPT | Performed by: INTERNAL MEDICINE

## 2023-09-15 PROCEDURE — 77067 SCR MAMMO BI INCL CAD: CPT | Performed by: INTERNAL MEDICINE

## 2023-10-14 ENCOUNTER — HOSPITAL ENCOUNTER (OUTPATIENT)
Facility: HOSPITAL | Age: 48
Setting detail: HOSPITAL OUTPATIENT SURGERY
Discharge: HOME | End: 2023-10-14
Attending: INTERNAL MEDICINE | Admitting: INTERNAL MEDICINE
Payer: COMMERCIAL

## 2023-10-14 VITALS
OXYGEN SATURATION: 97 % | HEIGHT: 62 IN | RESPIRATION RATE: 16 BRPM | HEART RATE: 56 BPM | WEIGHT: 142 LBS | DIASTOLIC BLOOD PRESSURE: 88 MMHG | BODY MASS INDEX: 26.13 KG/M2 | TEMPERATURE: 98 F | SYSTOLIC BLOOD PRESSURE: 120 MMHG

## 2023-10-14 DIAGNOSIS — D64.9 NORMOCYTIC ANEMIA: ICD-10-CM

## 2023-10-14 DIAGNOSIS — R10.10 PAIN OF UPPER ABDOMEN: ICD-10-CM

## 2023-10-14 DIAGNOSIS — Z12.11 COLON CANCER SCREENING: ICD-10-CM

## 2023-10-14 DIAGNOSIS — Z83.719 FAMILY HISTORY OF COLONIC POLYPS: ICD-10-CM

## 2023-10-14 PROBLEM — K57.30 DIVERTICULA OF COLON: Status: ACTIVE | Noted: 2023-10-14

## 2023-10-14 PROBLEM — K63.5 POLYP OF COLON: Status: ACTIVE | Noted: 2023-10-14

## 2023-10-14 LAB — B-HCG UR QL: NEGATIVE

## 2023-10-14 PROCEDURE — G0500 MOD SEDAT ENDO SERVICE >5YRS: HCPCS | Performed by: INTERNAL MEDICINE

## 2023-10-14 PROCEDURE — 0DBP8ZX EXCISION OF RECTUM, VIA NATURAL OR ARTIFICIAL OPENING ENDOSCOPIC, DIAGNOSTIC: ICD-10-PCS | Performed by: INTERNAL MEDICINE

## 2023-10-14 PROCEDURE — 45380 COLONOSCOPY AND BIOPSY: CPT | Performed by: INTERNAL MEDICINE

## 2023-10-14 RX ORDER — MIDAZOLAM HYDROCHLORIDE 1 MG/ML
INJECTION INTRAMUSCULAR; INTRAVENOUS
Status: DISCONTINUED | OUTPATIENT
Start: 2023-10-14 | End: 2023-10-14

## 2023-10-14 RX ORDER — SODIUM CHLORIDE, SODIUM LACTATE, POTASSIUM CHLORIDE, CALCIUM CHLORIDE 600; 310; 30; 20 MG/100ML; MG/100ML; MG/100ML; MG/100ML
INJECTION, SOLUTION INTRAVENOUS CONTINUOUS
Status: DISCONTINUED | OUTPATIENT
Start: 2023-10-14 | End: 2023-10-14

## 2023-10-14 RX ORDER — SODIUM CHLORIDE 0.9 % (FLUSH) 0.9 %
10 SYRINGE (ML) INJECTION AS NEEDED
Status: DISCONTINUED | OUTPATIENT
Start: 2023-10-14 | End: 2023-10-14

## 2023-10-14 RX ORDER — MIDAZOLAM HYDROCHLORIDE 1 MG/ML
1 INJECTION INTRAMUSCULAR; INTRAVENOUS EVERY 5 MIN PRN
Status: DISCONTINUED | OUTPATIENT
Start: 2023-10-14 | End: 2023-10-14

## 2023-10-14 NOTE — OPERATIVE REPORT
COLONOSCOPY REPORT    Iesha Morales     1975 Age 51year old   PCP Gloria Plummer MD Endoscopist Reinaldo Randall MD     Date of procedure: 10/14/23    Procedure: Colonoscopy w/cold biopsy    Pre-operative diagnosis: Screening, family hx of polyps    Post-operative diagnosis: Polyp(s) of colon, diverticulosis of the colon, internal hemorrhoids    Medications: Versed 6 mg IV push. Fentanyl 75 mcg IV push. [Per my order and under my supervision, the patient was sedated with intermittent intravenous doses of versed and fentanyl. The vital signs were monitored and recorded by an experienced RN. The procedure started after the patient was adequately sedated. Total time for moderate conscious sedation was 25 minutes]. Withdrawal time: 18 minutes    Procedure:  Informed consent was obtained from the patient after the risks of the procedure were discussed, including but not limited to bleeding, perforation, aspiration, infection, or possibility of a missed lesion. After discussions of the risks/benefits and alternatives to this procedure, as well as the planned sedation, the patient was placed in the left lateral decubitus position and begun on continuous blood pressure pulse oximetry and EKG monitoring and this was maintained throughout the procedure. Once an adequate level of sedation was obtained a digital rectal exam was completed. Then the lubricated tip of the [id number] diagnostic video colonoscope was inserted and advanced without difficulty to the cecum using the CO2 insufflation technique. The cecum was identified by localizing the trifold, the appendix and the ileocecal valve. Withdrawal was begun with thorough washing and careful examination of the colonic walls and folds. A routine second examination of the cecum/ascending colon was performed. Photodocumentation was obtained. The bowel prep was good. Views of the colon were good with washing.  I then carefully withdrew the instrument from the patient who tolerated the procedure well. Complications: none. Findings:   1. One polyp(s) noted as follows:      A. 2 mm polyp in the rectum colon; diminutive morphology; cold biopsy polypectomy and retrieved. 2. Diverticulosis: mild throughout the colon. 3. Terminal ileum: the visualized mucosa appeared normal.    4. The colonic mucosa throughout the colon showed normal vascular pattern, without evidence of angioectasias or inflammation. 5. A retroflexed view of the rectum revealed small internal hemorrhoids. 6. SAGE: normal rectal tone, no masses palpated. Impression:   One small colon polyp removed. Diverticulosis and internal hemorrhoids. Recommend:  Await pathology. The interval for the next colonoscopy will be determined after reviewing pathology - likely 5 years due to family hx. If new signs or symptoms develop, colonoscopy may need to be repeated sooner. High fiber diet. Monitor for blood in the stool. If having more than just tinge of blood, call office or go to the ER.    >>>If tissue was obtained and you have not received your pathology results either by phone or letter within 2 weeks, please call our office at [phone].     Specimens: colon    Blood loss: <1 ml

## 2023-10-14 NOTE — H&P
History & Physical Examination    Patient Name: Liliam Ponce  MRN: [id number]  CSN: [id number]  YOB: 1975    Diagnosis: screening for colon cancer    PEG 3350-KCl-Na Bicarb-NaCl (TRILYTE) 420 g Oral Recon Soln, Take prep as directed by gastro office. May substitute with Trilyte/generic equivalent if needed. , Disp: 4000 mL, Rfl: 0      sodium chloride 0.9% 0.9% flush injection 10 mL, 10 mL, Intravenous, PRN  lactated ringers infusion, , Intravenous, Continuous        Allergies: No Known Allergies    Past Medical History:   Diagnosis Date    Abdominal wall pain 2019    Cervical dysplasia     normal pap 2016    Essential hypertension Father    Fibroid uterus     Fibroids     Hyperlipidemia Father    Infertility, female     Persistent headaches 05/15/2017    Prediabetes      Past Surgical History:   Procedure Laterality Date    LASIK      LEEP      for mild dysplasia    MYOMECTOMY /> INTRAMURAL MYOMAS &/OR TOTAL WT >250 GMS, ABDOMINAL APPROACH  2018    She was told she would need  deliveries    ORAL SURGERY PROCEDURE      Cyst on inner jaw    OTHER SURGICAL HISTORY  polyp cervix , cyst on jaw ,fibroid 2018,m     Family History   Problem Relation Age of Onset    High Blood Pressure Father     Diabetes Father     Hypertension Father     Diabetes Mother     Hypertension Mother     Stroke Paternal Grandfather     Heart Disorder Paternal Grandfather     Thyroid disease Paternal Aunt     Cancer Neg      Social History    Tobacco Use      Smoking status: Never      Smokeless tobacco: Never      Tobacco comments: none    Alcohol use: Yes      Comment: social settings only.  1 DRINK/MONTH      SYSTEM Check if Review is Normal Check if Physical Exam is Normal If not normal, please explain:   HEENT Alair.Catarina ] [ Alaina Chaidezrine  Alair.Catarina ] [ X]    HEART Alair.Catarina ] [ Arbutus Razor Alair.Catarina ] [ Maxx Hemming Alair.Catarina ] [ Zilphia Johnathon Alair.Catarina ] [ X]    OTHER        I have discussed the risks and benefits and alternatives of the procedure with the patient/family. They understand and agree to proceed with plan of care. I have reviewed the History and Physical done within the last 30 days. Any changes noted above.     Jeffrey Emmanuel, [address] [place] [address] - Gastroenterology  10/14/2023  8:39 AM

## 2023-11-03 ENCOUNTER — TELEPHONE (OUTPATIENT)
Facility: CLINIC | Age: 48
End: 2023-11-03

## 2023-11-03 NOTE — TELEPHONE ENCOUNTER
I mailed out colonoscopy results letter to pt  Updated health maintenance  Entered into 5 yr CLN recall  Recall colon in 5 years per.  Colon done 10/14/2023    Rich Reardon MD  [facility] Clinical Staff  GI staff: please place recall for colonoscopy in 5 years

## 2023-11-28 ENCOUNTER — OFFICE VISIT (OUTPATIENT)
Dept: OBGYN CLINIC | Facility: CLINIC | Age: 48
End: 2023-11-28
Payer: COMMERCIAL

## 2023-11-28 VITALS
SYSTOLIC BLOOD PRESSURE: 149 MMHG | WEIGHT: 143 LBS | BODY MASS INDEX: 26.31 KG/M2 | HEIGHT: 62 IN | DIASTOLIC BLOOD PRESSURE: 88 MMHG

## 2023-11-28 DIAGNOSIS — Z01.419 ENCOUNTER FOR WELL WOMAN EXAM WITH ROUTINE GYNECOLOGICAL EXAM: Primary | ICD-10-CM

## 2023-11-28 DIAGNOSIS — Z11.3 ROUTINE SCREENING FOR STI (SEXUALLY TRANSMITTED INFECTION): ICD-10-CM

## 2023-11-28 PROCEDURE — 99396 PREV VISIT EST AGE 40-64: CPT | Performed by: OBSTETRICS & GYNECOLOGY

## 2023-11-28 PROCEDURE — 3008F BODY MASS INDEX DOCD: CPT | Performed by: OBSTETRICS & GYNECOLOGY

## 2023-11-28 PROCEDURE — 3079F DIAST BP 80-89 MM HG: CPT | Performed by: OBSTETRICS & GYNECOLOGY

## 2023-11-28 PROCEDURE — 3077F SYST BP >= 140 MM HG: CPT | Performed by: OBSTETRICS & GYNECOLOGY

## 2023-11-29 LAB
C TRACH DNA SPEC QL NAA+PROBE: NEGATIVE
HPV I/H RISK 1 DNA SPEC QL NAA+PROBE: NEGATIVE
N GONORRHOEA DNA SPEC QL NAA+PROBE: NEGATIVE

## 2024-10-18 NOTE — PATIENT INSTRUCTIONS
-h.pylori testing  -pepcid as needed  -reflux diet modification    1. Schedule colonoscopy with [facility]ENN[facility] w/ Dr. Jessica Sunshine [Diagnosis: crc screening]    2.  bowel prep from pharmacy (split trilyte)    3. Continue all medications for procedure    4.  Read all Addended by: ALEX BETANCOURT on: 10/18/2024 04:10 PM     Modules accepted: Orders     rights reserved. This information is not intended as a substitute for professional medical care. Always follow your healthcare professional's instructions.

## (undated) DEVICE — PROXIMATE RH ROTATING HEAD SKIN STAPLERS (35 WIDE) CONTAINS 35 STAINLESS STEEL STAPLES: Brand: PROXIMATE

## (undated) DEVICE — SOL  .9 1000ML BTL

## (undated) DEVICE — SUTURE VICRYL 0 J340H

## (undated) DEVICE — MEDI-VAC NON-CONDUCTIVE SUCTION TUBING 6MM X 1.8M (6FT.) L: Brand: CARDINAL HEALTH

## (undated) DEVICE — VIOLET BRAIDED (POLYGLACTIN 910), SYNTHETIC ABSORBABLE SUTURE: Brand: COATED VICRYL

## (undated) DEVICE — Device: Brand: DUAL NARE NASAL CANNULAE FEMALE LUER CON 7FT O2 TUBE

## (undated) DEVICE — 60 ML SYRINGE REGULAR TIP: Brand: MONOJECT

## (undated) DEVICE — WOUND RETRACTOR AND PROTECTOR: Brand: ALEXIS O WOUND PROTECTOR-RETRACTOR

## (undated) DEVICE — KIT CLEAN ENDOKIT 1.1OZ GOWNX2

## (undated) DEVICE — KIT ENDO ORCAPOD 160/180/190

## (undated) DEVICE — FORCEPS BX L240CM DIA2.4MM L NDL RAD JAW 4

## (undated) DEVICE — ADHESIVE MASTISOL 2/3CC VL

## (undated) DEVICE — UNDYED BRAIDED (POLYGLACTIN 910), SYNTHETIC ABSORBABLE SUTURE: Brand: COATED VICRYL

## (undated) DEVICE — DRAPE SHEET TRANSVERSE LAP

## (undated) DEVICE — LAPAROTOMY: Brand: MEDLINE INDUSTRIES, INC.

## (undated) DEVICE — 3M™ STERI-STRIP™ REINFORCED ADHESIVE SKIN CLOSURES, R1547, 1/2 IN X 4 IN (12 MM X 100 MM), 6 STRIPS/ENVELOPE: Brand: 3M™ STERI-STRIP™

## (undated) DEVICE — STERILE LATEX POWDER-FREE SURGICAL GLOVESWITH NITRILE COATING: Brand: PROTEXIS

## (undated) DEVICE — SUTURE VICRYL 2-0 CT-1

## (undated) DEVICE — SUTURE VICRYL 3-0 SH

## (undated) NOTE — LETTER
9/25/2017              Rogers Colon        [address] [place] [address]         Dear Raul Addison,    It was a pleasure to see you at our [address] [place] [address] Road OB/GYNE office.   Your Mammogram was Normal.  There is no need for fu

## (undated) NOTE — LETTER
AUTHORIZATION FOR SURGICAL OPERATION OR OTHER PROCEDURE    1. I hereby authorize Dr. Bessy Corona, and [facility] staff assigned to my case to perform the following operation and/or procedure at the [facility], [facility]:    _______________________________________________________________________________________________    SALINE SONOGRAPHY  _______________________________________________________________________________________________    2. My physician has explained the nature and purpose of the operation or other procedure, possible alternative methods of treatment, the risks involved, and the possibility of complication to me. I acknowledge that no guarantee has been made as to the result that may be obtained. 3.  I recognize that, during the course of this operation, or other procedure, unforseen conditions may necessitate additional or different procedure than those listed above. I, therefore, further authorize and request that the above named physician, his/her physician assistants or designees perform such procedures as are, in his/her professional opinion, necessary and desirable. 4.  Any tissue or organs removed in the operation or other procedure may be disposed of by and at the discretion of the [facility] and [address]. 5.  I understand that in the event of a medical emergency, I will be transported by local paramedics to [facility] or other [facility] emergency department. 6.  I certify that I have read and fully understand the above consent to operation and/or other procedure. 7.  I acknowledge that my physician has explained sedation/analgesia administration to me including the risks and benefits. I consent to the administration of sedation/analgesia as may be necessary or desirable in the judgement of my physician.     Witness signature: ___________________________________________________ Date:  ______/______/_____                    Time: ________ A. M.  P.M. Patient Name:  ______________________________________________________  (please print)      Patient signature:  ___________________________________________________             Relationship to Patient:           []  Parent    Responsible person                          []  Spouse  In case of minor or                    [] Other  _____________   Incompetent name:  __________________________________________________                               (please print)      _____________      Responsible person  In case of minor or  Incompetent signature:  _______________________________________________    Statement of Physician  My signature below affirms that prior to the time of the procedure, I have explained to the patient and/or his/her guardian, the risks and benefits involved in the proposed treatment and any reasonable alternative to the proposed treatment. I have also explained the risks and benefits involved in the refusal of the proposed treatment and have answered the patient's questions.                         Date:  ______/______/_______  Provider                      Signature:  __________________________________________________________       Time:  ___________ A.M    P.M.

## (undated) NOTE — ED AVS SNAPSHOT
[facility] Emergency Department  [address] [place] [address]  Phone:  [phone]  Fax:  [phone]          Beth Isaiah   MRN: [id number]    Department:  [facility] Emergency Department   Date of Visit:  7/2/2017 visiting [email or web address].    IF THERE IS ANY CHANGE OR WORSENING OF YOUR CONDITION, CALL YOUR PRIMARY CARE PHYSICIAN AT ONCE OR RETURN IMMEDIATELY TO THE EMERGENCY DEPARTMENT.     If you have been prescribed any medication(s), please fill your prescription

## (undated) NOTE — LETTER
[address] [place] [address], [place]  Authorization for Surgical Operation and Procedure                                                                                           I hereby authorize Joe Shetty MD, my physician and his/her assistants (if applicable), which may include medical students, residents, and/or fellows, to perform the following surgical operation/ procedure and administer such anesthesia as may be determined necessary by my physician: Operation/Procedure name (s) COLONOSCOPY on Itzel Arrant   2. I recognize that during the surgical operation/procedure, unforeseen conditions may necessitate additional or different procedures than those listed above. I, therefore, further authorize and request that the above-named surgeon, assistants, or designees perform such procedures as are, in their judgment, necessary and desirable. 3.   My surgeon/physician has discussed prior to my surgery the potential benefits, risks and side effects of this procedure; the likelihood of achieving goals; and potential problems that might occur during recuperation. They also discussed reasonable alternatives to the procedure, including risks, benefits, and side effects related to the alternatives and risks related to not receiving this procedure. I have had all my questions answered and I acknowledge that no guarantee has been made as to the result that may be obtained. 4.   Should the need arise during my operation/procedure, which includes change of level of care prior to discharge, I also consent to the administration of blood and/or blood products. Further, I understand that despite careful testing and screening of blood or blood products by collecting agencies, I may still be subject to ill effects as a result of receiving a blood transfusion and/or blood products.   The following are some, but not all, of the potential risks that can occur: fever and allergic reactions, hemolytic reactions, transmission of diseases such as Hepatitis, AIDS and Cytomegalovirus (CMV) and fluid overload. In the event that I wish to have an autologous transfusion of my own blood, or a directed donor transfusion, I will discuss this with my physician. Check only if Refusing Blood or Blood Products  I understand refusal of blood or blood products as deemed necessary by my physician may have serious consequences to my condition to include possible death. I hereby assume responsibility for my refusal and release the hospital, its personnel, and my physicians from any responsibility for the consequences of my refusal.    o  Refuse   5. I authorize the use of any specimen, organs, tissues, body parts or foreign objects that may be removed from my body during the operation/procedure for diagnosis, research or teaching purposes and their subsequent disposal by hospital authorities. I also authorize the release of specimen test results and/or written reports to my treating physician on the hospital medical staff or other referring or consulting physicians involved in my care, at the discretion of the Pathologist or my treating physician. 6.   I consent to the photographing or videotaping of the operations or procedures to be performed, including appropriate portions of my body for medical, scientific, or educational purposes, provided my identity is not revealed by the pictures or by descriptive texts accompanying them. If the procedure has been photographed/videotaped, the surgeon will obtain the original picture, image, videotape or CD. The hospital will not be responsible for storage, release or maintenance of the picture, image, tape or CD.    7.   I consent to the presence of a  or observers in the operating room as deemed necessary by my physician or their designees.     8.   I recognize that in the event my procedure results in extended X-Ray/fluoroscopy time, I may develop a skin reaction. 9. If I have a Do Not Attempt Resuscitation (DNAR) order in place, that status will be suspended while in the operating room, procedural suite, and during the recovery period unless otherwise explicitly stated by me (or a person authorized to consent on my behalf). The surgeon or my attending physician will determine when the applicable recovery period ends for purposes of reinstating the DNAR order. 10. Patients having a sterilization procedure: I understand that if the procedure is successful the results will be permanent and it will therefore be impossible for me to inseminate, conceive, or bear children. I also understand that the procedure is intended to result in sterility, although the result has not been guaranteed. 11. I acknowledge that my physician has explained sedation/analgesia administration to me including the risk and benefits I consent to the administration of sedation/analgesia as may be necessary or desirable in the judgment of my physician. I CERTIFY THAT I HAVE READ AND FULLY UNDERSTAND THE ABOVE CONSENT TO OPERATION and/or OTHER PROCEDURE.     _________________________________________ _________________________________     ___________________________________  Signature of Patient     Signature of Responsible Person                   Printed Name of Responsible Person                              _________________________________________ ______________________________        ___________________________________  Signature of Witness         Date  Time         Relationship to Patient    STATEMENT OF PHYSICIAN My signature below affirms that prior to the time of the procedure; I have explained to the patient and/or his/her legal representative, the risks and benefits involved in the proposed treatment and any reasonable alternative to the proposed treatment.  I have also explained the risks and benefits involved in refusal of the proposed treatment and alternatives to the proposed treatment and have answered the patient's questions.  If I have a significant financial interest in a co-management agreement or a significant financial interest in any product or implant, or other significant relationship used in this procedure/surgery, I have disclosed this and had a discussion with my patient.     _______________________________________________________________ _____________________________  Lydia Shah Physician)                                                                                         (Date)                                   (Time)  Patient Name: Tremaine Novak    : 1975   Printed: 10/11/2023      Medical Record #: [id number]                                              Page 1 of 1

## (undated) NOTE — MR AVS SNAPSHOT
After Visit Summary   12/2/2020    Bette Hamilton    MRN: [id number]           Visit Information     Date & Time  12/2/2020  3:00 PM Provider  Willow Guerin, [address]  Phone  [phone] If you receive a survey from [employer], please take a few minutes to complete it and provide feedback. We strive to deliver the best patient experience and are looking for ways to make improvements. Your feedback will help us do so.  For more information EMERGENCY ROOM Life-threatening emergencies needing immediate intervention at a hospital emergency room.  Average cost  $2,300*   *Cost varies based on your insurance coverage  For more information about hours, locations or appointment options available at

## (undated) NOTE — LETTER
01/21/22        [address] 26      Dear Jose Elias Bower,    [facility] records indicate that you have outstanding lab work and or testing that was ordered for you and has not yet been completed:    [address], JULIO CESAR chandraul

## (undated) NOTE — LETTER
11/14/2018              Beth Colon        [address] [facility]         Dear Luis Chase,    It was a pleasure to see you. Your pap and Hpv cotesting was normal.  There is no need for further testing at this time.   I look forward to

## (undated) NOTE — MR AVS SNAPSHOT
After Visit Summary   11/14/2019    Balaji Miguel    MRN: [id number]           Visit Information     Date & Time  11/14/2019 10:40 AM Provider  Rae Garcia MD [facility], [facility], [address] [place] [address]  Dept.  Phone  [phone] headache and pink eye. The cost for a Video Visit is currently $35.         If you receive a survey from [employer], please take a few minutes to complete it and provide feedback.  We strive to deliver the best patient experience and are looking for ways visit: [email or web address] or call [phone]. MY. ([phone])

## (undated) NOTE — Clinical Note
Patient who reports being pregnant complains of abdominal cramping for 2 weeks with increased pain this morning accompanied with vaginal bleeding that starting with spotting 3 days ago then heavier bright red bleeding.  Patient also reports experiencing d i

## (undated) NOTE — MR AVS SNAPSHOT
After Visit Summary   12/2/2021    Lynn Reynolds   MRN: [id number]           Visit Information     Date & Time  12/2/2021 10:40 AM Provider  Veronica Goldmann, MD [facility], [facility], [address] [place] [address]  Dept.  Phone  [phone] Follow-up Instructions    Return in about 1 year (around 12/2/2022) for Routine Gyne Visit.                Did you know that [facility] primary care physicians now offer Video Visits through [facility] for adult patients for a variety of conditions such as allergie

## (undated) NOTE — LETTER
May 20, 2017      [facility] [id number]      Emelina Andersonhitesh-    I reviewed the results of the tailbone xrays. No fracture or dislocation. Nothing that looks worrisome, no bone destruction.  The spine immediately above the

## (undated) NOTE — LETTER
12/4/2023              [facility] Shubham        [address] [facility]         Dear [employer],    It was a pleasure to see you. Your PAP test was normal.  There is no need for further testing at this time. I look forward to seeing you at your next scheduled appointment.       Sincerely,      Nikki Buchanan MD  [facility], [address], [facility] [address]  [address]  [phone]